# Patient Record
Sex: FEMALE | Race: OTHER
[De-identification: names, ages, dates, MRNs, and addresses within clinical notes are randomized per-mention and may not be internally consistent; named-entity substitution may affect disease eponyms.]

---

## 2021-09-17 ENCOUNTER — RESULT REVIEW (OUTPATIENT)
Age: 56
End: 2021-09-17

## 2022-05-07 ENCOUNTER — RESULT REVIEW (OUTPATIENT)
Age: 57
End: 2022-05-07

## 2022-09-30 ENCOUNTER — APPOINTMENT (OUTPATIENT)
Dept: VASCULAR SURGERY | Facility: CLINIC | Age: 57
End: 2022-09-30

## 2022-09-30 VITALS
DIASTOLIC BLOOD PRESSURE: 80 MMHG | WEIGHT: 293 LBS | SYSTOLIC BLOOD PRESSURE: 138 MMHG | HEIGHT: 69 IN | HEART RATE: 52 BPM | BODY MASS INDEX: 43.4 KG/M2 | TEMPERATURE: 95.5 F

## 2022-09-30 PROCEDURE — 99204 OFFICE O/P NEW MOD 45 MIN: CPT

## 2022-09-30 PROCEDURE — 93970 EXTREMITY STUDY: CPT

## 2023-06-08 ENCOUNTER — OUTPATIENT (OUTPATIENT)
Dept: OUTPATIENT SERVICES | Facility: HOSPITAL | Age: 58
LOS: 1 days | End: 2023-06-08
Payer: COMMERCIAL

## 2023-06-08 VITALS
HEIGHT: 69 IN | HEART RATE: 65 BPM | TEMPERATURE: 98 F | RESPIRATION RATE: 16 BRPM | DIASTOLIC BLOOD PRESSURE: 79 MMHG | WEIGHT: 293 LBS | SYSTOLIC BLOOD PRESSURE: 147 MMHG | OXYGEN SATURATION: 98 %

## 2023-06-08 DIAGNOSIS — Z98.891 HISTORY OF UTERINE SCAR FROM PREVIOUS SURGERY: Chronic | ICD-10-CM

## 2023-06-08 DIAGNOSIS — N95.0 POSTMENOPAUSAL BLEEDING: ICD-10-CM

## 2023-06-08 DIAGNOSIS — Z01.818 ENCOUNTER FOR OTHER PREPROCEDURAL EXAMINATION: ICD-10-CM

## 2023-06-08 DIAGNOSIS — Z98.890 OTHER SPECIFIED POSTPROCEDURAL STATES: Chronic | ICD-10-CM

## 2023-06-08 DIAGNOSIS — E66.01 MORBID (SEVERE) OBESITY DUE TO EXCESS CALORIES: ICD-10-CM

## 2023-06-08 DIAGNOSIS — T83.32XA DISPLACEMENT OF INTRAUTERINE CONTRACEPTIVE DEVICE, INITIAL ENCOUNTER: ICD-10-CM

## 2023-06-08 DIAGNOSIS — I10 ESSENTIAL (PRIMARY) HYPERTENSION: ICD-10-CM

## 2023-06-08 LAB
ALBUMIN SERPL ELPH-MCNC: 2.5 G/DL — LOW (ref 3.5–5)
ALP SERPL-CCNC: 100 U/L — SIGNIFICANT CHANGE UP (ref 40–120)
ALT FLD-CCNC: 13 U/L DA — SIGNIFICANT CHANGE UP (ref 10–60)
ANION GAP SERPL CALC-SCNC: 2 MMOL/L — LOW (ref 5–17)
APTT BLD: 31.3 SEC — SIGNIFICANT CHANGE UP (ref 27.5–35.5)
AST SERPL-CCNC: 9 U/L — LOW (ref 10–40)
BILIRUB SERPL-MCNC: 0.4 MG/DL — SIGNIFICANT CHANGE UP (ref 0.2–1.2)
BLD GP AB SCN SERPL QL: SIGNIFICANT CHANGE UP
BUN SERPL-MCNC: 18 MG/DL — SIGNIFICANT CHANGE UP (ref 7–18)
CALCIUM SERPL-MCNC: 8.6 MG/DL — SIGNIFICANT CHANGE UP (ref 8.4–10.5)
CHLORIDE SERPL-SCNC: 112 MMOL/L — HIGH (ref 96–108)
CO2 SERPL-SCNC: 29 MMOL/L — SIGNIFICANT CHANGE UP (ref 22–31)
CREAT SERPL-MCNC: 1.29 MG/DL — SIGNIFICANT CHANGE UP (ref 0.5–1.3)
EGFR: 48 ML/MIN/1.73M2 — LOW
GLUCOSE SERPL-MCNC: 97 MG/DL — SIGNIFICANT CHANGE UP (ref 70–99)
HCT VFR BLD CALC: 32.3 % — LOW (ref 34.5–45)
HGB BLD-MCNC: 9.5 G/DL — LOW (ref 11.5–15.5)
INR BLD: 1.12 RATIO — SIGNIFICANT CHANGE UP (ref 0.88–1.16)
MCHC RBC-ENTMCNC: 26.9 PG — LOW (ref 27–34)
MCHC RBC-ENTMCNC: 29.4 GM/DL — LOW (ref 32–36)
MCV RBC AUTO: 91.5 FL — SIGNIFICANT CHANGE UP (ref 80–100)
NRBC # BLD: 0 /100 WBCS — SIGNIFICANT CHANGE UP (ref 0–0)
PLATELET # BLD AUTO: 378 K/UL — SIGNIFICANT CHANGE UP (ref 150–400)
POTASSIUM SERPL-MCNC: 4.3 MMOL/L — SIGNIFICANT CHANGE UP (ref 3.5–5.3)
POTASSIUM SERPL-SCNC: 4.3 MMOL/L — SIGNIFICANT CHANGE UP (ref 3.5–5.3)
PROT SERPL-MCNC: 7.8 G/DL — SIGNIFICANT CHANGE UP (ref 6–8.3)
PROTHROM AB SERPL-ACNC: 13.3 SEC — SIGNIFICANT CHANGE UP (ref 10.5–13.4)
RBC # BLD: 3.53 M/UL — LOW (ref 3.8–5.2)
RBC # FLD: 15.4 % — HIGH (ref 10.3–14.5)
SODIUM SERPL-SCNC: 143 MMOL/L — SIGNIFICANT CHANGE UP (ref 135–145)
WBC # BLD: 8.35 K/UL — SIGNIFICANT CHANGE UP (ref 3.8–10.5)
WBC # FLD AUTO: 8.35 K/UL — SIGNIFICANT CHANGE UP (ref 3.8–10.5)

## 2023-06-08 PROCEDURE — 71046 X-RAY EXAM CHEST 2 VIEWS: CPT | Mod: 26

## 2023-06-08 NOTE — H&P PST ADULT - ASSESSMENT
Dilation and Curettage with Hysteroscopy and Hysteroscopic Intrauterine Device Removal on 6/12/23 with Dr. Sidhu   58 y.o morbidly obese female with Post Menopausal Bleeding and now for schedule Dilation and Curettage with Hysteroscopy and   Hysteroscopic Intrauterine Device Removal on 6/12/23 with Dr. Sidhu   Dilation and Curettage with Hysteroscopy and Hysteroscopic Intrauterine Device Removal on 6/12/23 with Dr. Nahum FINE 4- High Risk for KATIE

## 2023-06-08 NOTE — H&P PST ADULT - REASON FOR ADMISSION
Dilation and Curettage with Hysteroscopy and Hysteroscopic Intrauterine Device Removal on 6/12/23 with Dr. Sidhu

## 2023-06-08 NOTE — H&P PST ADULT - PROBLEM SELECTOR PLAN 3
Education provided:   Pt encourage to eat plenty of fruits and vegetables. Limiting red meats, processed foods (chips, cookies, sugary cereals) and sugar-sweetened beverages like soda and juice. Slowly increasing physical activity.  Pt also instructed to f/u with with PCP after surgery

## 2023-06-08 NOTE — H&P PST ADULT - HISTORY OF PRESENT ILLNESS
Dilation and Curettage with Hysteroscopy and Hysteroscopic Intrauterine Device Removal on 6/12/23 with Dr. Sidhu   58 y.o morbidly obese female with PMHx for Obesity (BMI 62 ) HTN, right wrist surgery 2013. C/O post menopausal bleeding for 3 months  and now presents for presurgical evaluation for schedule Dilation and Curettage with Hysteroscopy and   Hysteroscopic Intrauterine Device Removal on 6/12/23 with Dr. Sidhu

## 2023-06-08 NOTE — H&P PST ADULT - PROBLEM SELECTOR PLAN 1
Pt schedule for Dilation and Curettage with Hysteroscopy and Hysteroscopic Intrauterine Device Removal on 6/12/23 with Dr. Sidhu    Labs drawn in PCP - results in chart   Pt Medical clearance- in chart      Pt was  instructed to stop aspirin/ecotrin and all over the counter medication including vitamins and herbal supplements one week prior to surgery   Instructions given on the use of 4% chlorhexidine wash and Pt verbalized understanding of same   Pt Instructed to have nothing by mouth starting midnight day before surgery  Patient is to expect a phone call day before surgery between the hours of 430- 630pm giving arrival time for surgery   Written and verbal preoperative instructions given to patient with understanding verbalized.     Patient today with STOP bang score 3  Low  risk for KATIE Pt schedule for Dilation and Curettage with Hysteroscopy and Hysteroscopic Intrauterine Device Removal on 6/12/23 with Dr. Sidhu    Labs and CXR done in PST 6/8/23- will f/u result   Pt was seen by PCP for Medical clearance6/8/23- will f/u report in chart    Pt was  instructed to stop aspirin/ecotrin and all over the counter medication including vitamins and herbal supplements one week prior to surgery   Instructions given on the use of 4% chlorhexidine wash and Pt verbalized understanding of same   Pt Instructed to have nothing by mouth starting midnight day before surgery  Patient is to expect a phone call day before surgery between the hours of 430- 630pm giving arrival time for surgery   Written and verbal preoperative instructions given to patient with understanding verbalized.     Patient today with STOP bang score 4  High risk for KATIE

## 2023-06-08 NOTE — H&P PST ADULT - NSICDXPROCEDURE_GEN_ALL_CORE_FT
PROCEDURES:  Hysteroscopy with biopsy 08-Jun-2023 15:52:31  Rashmi Rojas  Hysteroscopy, with IUD removal 08-Jun-2023 15:53:31  Rashmi Rojas

## 2023-06-09 PROCEDURE — 71046 X-RAY EXAM CHEST 2 VIEWS: CPT

## 2023-06-09 PROCEDURE — G0463: CPT

## 2023-06-11 ENCOUNTER — TRANSCRIPTION ENCOUNTER (OUTPATIENT)
Age: 58
End: 2023-06-11

## 2023-06-12 ENCOUNTER — OUTPATIENT (OUTPATIENT)
Dept: OUTPATIENT SERVICES | Facility: HOSPITAL | Age: 58
LOS: 1 days | End: 2023-06-12
Payer: COMMERCIAL

## 2023-06-12 ENCOUNTER — TRANSCRIPTION ENCOUNTER (OUTPATIENT)
Age: 58
End: 2023-06-12

## 2023-06-12 VITALS
RESPIRATION RATE: 18 BRPM | OXYGEN SATURATION: 99 % | DIASTOLIC BLOOD PRESSURE: 79 MMHG | TEMPERATURE: 98 F | HEART RATE: 61 BPM | SYSTOLIC BLOOD PRESSURE: 114 MMHG

## 2023-06-12 VITALS
RESPIRATION RATE: 16 BRPM | WEIGHT: 293 LBS | SYSTOLIC BLOOD PRESSURE: 120 MMHG | HEIGHT: 69 IN | HEART RATE: 64 BPM | OXYGEN SATURATION: 97 % | DIASTOLIC BLOOD PRESSURE: 75 MMHG | TEMPERATURE: 99 F

## 2023-06-12 DIAGNOSIS — Z98.890 OTHER SPECIFIED POSTPROCEDURAL STATES: Chronic | ICD-10-CM

## 2023-06-12 DIAGNOSIS — N95.0 POSTMENOPAUSAL BLEEDING: ICD-10-CM

## 2023-06-12 DIAGNOSIS — Z01.818 ENCOUNTER FOR OTHER PREPROCEDURAL EXAMINATION: ICD-10-CM

## 2023-06-12 DIAGNOSIS — Z98.891 HISTORY OF UTERINE SCAR FROM PREVIOUS SURGERY: Chronic | ICD-10-CM

## 2023-06-12 DIAGNOSIS — T83.32XA DISPLACEMENT OF INTRAUTERINE CONTRACEPTIVE DEVICE, INITIAL ENCOUNTER: ICD-10-CM

## 2023-06-12 LAB — BLD GP AB SCN SERPL QL: SIGNIFICANT CHANGE UP

## 2023-06-12 PROCEDURE — 86901 BLOOD TYPING SEROLOGIC RH(D): CPT

## 2023-06-12 PROCEDURE — 88342 IMHCHEM/IMCYTCHM 1ST ANTB: CPT | Mod: 26,59

## 2023-06-12 PROCEDURE — 86900 BLOOD TYPING SEROLOGIC ABO: CPT

## 2023-06-12 PROCEDURE — 58562 HYSTEROSCOPY REMOVE FB: CPT

## 2023-06-12 PROCEDURE — 88341 IMHCHEM/IMCYTCHM EA ADD ANTB: CPT

## 2023-06-12 PROCEDURE — 88360 TUMOR IMMUNOHISTOCHEM/MANUAL: CPT | Mod: 26

## 2023-06-12 PROCEDURE — 88305 TISSUE EXAM BY PATHOLOGIST: CPT | Mod: 26

## 2023-06-12 PROCEDURE — 88300 SURGICAL PATH GROSS: CPT

## 2023-06-12 PROCEDURE — C1782: CPT

## 2023-06-12 PROCEDURE — 88341 IMHCHEM/IMCYTCHM EA ADD ANTB: CPT | Mod: 26,59

## 2023-06-12 PROCEDURE — 88300 SURGICAL PATH GROSS: CPT | Mod: 26,59

## 2023-06-12 PROCEDURE — 88360 TUMOR IMMUNOHISTOCHEM/MANUAL: CPT

## 2023-06-12 PROCEDURE — 88305 TISSUE EXAM BY PATHOLOGIST: CPT

## 2023-06-12 PROCEDURE — 88342 IMHCHEM/IMCYTCHM 1ST ANTB: CPT

## 2023-06-12 PROCEDURE — 36415 COLL VENOUS BLD VENIPUNCTURE: CPT

## 2023-06-12 PROCEDURE — 86850 RBC ANTIBODY SCREEN: CPT

## 2023-06-12 DEVICE — MYOSURE TISSUE REMOVAL DEVICE REACH: Type: IMPLANTABLE DEVICE | Status: FUNCTIONAL

## 2023-06-12 DEVICE — AVETA FLEX RESECTING DEVICE 2.9MM: Type: IMPLANTABLE DEVICE | Status: FUNCTIONAL

## 2023-06-12 DEVICE — MYOSURE TISSUE REMOVAL FMS FOR FLUENT XL: Type: IMPLANTABLE DEVICE | Status: FUNCTIONAL

## 2023-06-12 RX ORDER — IBUPROFEN 200 MG
1 TABLET ORAL
Qty: 0 | Refills: 0 | DISCHARGE
Start: 2023-06-12

## 2023-06-12 RX ORDER — SODIUM CHLORIDE 9 MG/ML
3 INJECTION INTRAMUSCULAR; INTRAVENOUS; SUBCUTANEOUS EVERY 8 HOURS
Refills: 0 | Status: DISCONTINUED | OUTPATIENT
Start: 2023-06-12 | End: 2023-06-12

## 2023-06-12 RX ORDER — IBUPROFEN 200 MG
600 TABLET ORAL EVERY 6 HOURS
Refills: 0 | Status: DISCONTINUED | OUTPATIENT
Start: 2023-06-12 | End: 2023-06-26

## 2023-06-12 NOTE — BRIEF OPERATIVE NOTE - OPERATION/FINDINGS
Copper T IUD impacted in the lower uterine segment, removed in two pieces.  Upper uterine cavity obliterated by adhesion.

## 2023-06-12 NOTE — BRIEF OPERATIVE NOTE - NSICDXBRIEFPREOP_GEN_ALL_CORE_FT
PRE-OP DIAGNOSIS:  Post-menopausal bleeding 12-Jun-2023 09:27:09  Derek Sidhu  Retained intrauterine contraceptive device (IUD) 12-Jun-2023 09:27:39  Derek Sidhu

## 2023-06-12 NOTE — ASU DISCHARGE PLAN (ADULT/PEDIATRIC) - NS MD DC FALL RISK RISK
For information on Fall & Injury Prevention, visit: [email or web address] OR  [email or web address] OR  https://www.cdc.gov/steadi/patient.html

## 2023-06-12 NOTE — BRIEF OPERATIVE NOTE - NSICDXBRIEFPOSTOP_GEN_ALL_CORE_FT
POST-OP DIAGNOSIS:  Postmenopausal bleeding 12-Jun-2023 09:28:23  Derek Sidhu  Retained intrauterine contraceptive device (IUD) 12-Jun-2023 09:28:30  Derek Sidhu

## 2023-06-12 NOTE — ASU DISCHARGE PLAN (ADULT/PEDIATRIC) - CARE PROVIDER_API CALL
Derek Sidhu  Obstetrics and Gynecology  [address] [place] [address]  Phone: [phone]  Fax: [phone]  Follow Up Time: 2 weeks

## 2023-06-12 NOTE — BRIEF OPERATIVE NOTE - NSICDXBRIEFPROCEDURE_GEN_ALL_CORE_FT
PROCEDURES:  Hysteroscopy, with dilation and curettage 12-Jun-2023 09:25:58  Derek Sidhu  Removal, foreign body, hysteroscopic 12-Jun-2023 09:26:30  Derek Sidhu

## 2023-06-14 LAB — SURGICAL PATHOLOGY STUDY: SIGNIFICANT CHANGE UP

## 2023-10-20 ENCOUNTER — APPOINTMENT (OUTPATIENT)
Dept: VASCULAR SURGERY | Facility: CLINIC | Age: 58
End: 2023-10-20
Payer: COMMERCIAL

## 2023-10-20 VITALS
BODY MASS INDEX: 43.4 KG/M2 | HEART RATE: 67 BPM | TEMPERATURE: 98 F | HEIGHT: 69 IN | DIASTOLIC BLOOD PRESSURE: 82 MMHG | SYSTOLIC BLOOD PRESSURE: 126 MMHG | WEIGHT: 293 LBS

## 2023-10-20 VITALS — DIASTOLIC BLOOD PRESSURE: 88 MMHG | HEART RATE: 64 BPM | SYSTOLIC BLOOD PRESSURE: 130 MMHG

## 2023-10-20 PROCEDURE — 99212 OFFICE O/P EST SF 10 MIN: CPT

## 2023-10-20 PROCEDURE — 93970 EXTREMITY STUDY: CPT

## 2023-11-28 PROBLEM — I10 ESSENTIAL (PRIMARY) HYPERTENSION: Chronic | Status: ACTIVE | Noted: 2023-06-08

## 2023-11-28 PROBLEM — N93.9 ABNORMAL UTERINE AND VAGINAL BLEEDING, UNSPECIFIED: Chronic | Status: ACTIVE | Noted: 2023-06-08

## 2023-11-28 PROBLEM — E66.9 OBESITY, UNSPECIFIED: Chronic | Status: ACTIVE | Noted: 2023-06-08

## 2023-12-12 ENCOUNTER — APPOINTMENT (OUTPATIENT)
Dept: VASCULAR SURGERY | Facility: CLINIC | Age: 58
End: 2023-12-12
Payer: COMMERCIAL

## 2023-12-12 PROCEDURE — 99442: CPT

## 2023-12-12 RX ORDER — HYDROCHLOROTHIAZIDE 12.5 MG/1
TABLET ORAL
Refills: 0 | Status: ACTIVE | COMMUNITY

## 2023-12-12 RX ORDER — LOSARTAN POTASSIUM 100 MG/1
TABLET, FILM COATED ORAL
Refills: 0 | Status: ACTIVE | COMMUNITY

## 2023-12-12 RX ORDER — CARVEDILOL 3.12 MG/1
TABLET, FILM COATED ORAL
Refills: 0 | Status: ACTIVE | COMMUNITY

## 2024-02-22 ENCOUNTER — TRANSCRIPTION ENCOUNTER (OUTPATIENT)
Age: 59
End: 2024-02-22

## 2024-02-22 VITALS
HEIGHT: 69 IN | DIASTOLIC BLOOD PRESSURE: 75 MMHG | HEART RATE: 60 BPM | SYSTOLIC BLOOD PRESSURE: 137 MMHG | WEIGHT: 293 LBS | TEMPERATURE: 97 F | RESPIRATION RATE: 18 BRPM | OXYGEN SATURATION: 96 %

## 2024-02-22 NOTE — ASU PATIENT PROFILE, ADULT - FALL HARM RISK - UNIVERSAL INTERVENTIONS
Bed in lowest position, wheels locked, appropriate side rails in place/Call bell, personal items and telephone in reach/Instruct patient to call for assistance before getting out of bed or chair/Non-slip footwear when patient is out of bed/[place] to call system/Physically safe environment - no spills, clutter or unnecessary equipment/Purposeful Proactive Rounding/Room/bathroom lighting operational, light cord in reach

## 2024-02-22 NOTE — ASU PATIENT PROFILE, ADULT - NSICDXPASTSURGICALHX_GEN_ALL_CORE_FT
PAST SURGICAL HISTORY:  H/O  section     H/O wrist surgery right + hardware    History of ectopic pregnancy

## 2024-02-23 ENCOUNTER — TRANSCRIPTION ENCOUNTER (OUTPATIENT)
Age: 59
End: 2024-02-23

## 2024-02-23 ENCOUNTER — OUTPATIENT (OUTPATIENT)
Dept: OUTPATIENT SERVICES | Facility: HOSPITAL | Age: 59
LOS: 1 days | Discharge: HOME | End: 2024-02-23
Payer: COMMERCIAL

## 2024-02-23 VITALS
SYSTOLIC BLOOD PRESSURE: 119 MMHG | HEART RATE: 55 BPM | TEMPERATURE: 96 F | OXYGEN SATURATION: 94 % | RESPIRATION RATE: 15 BRPM | DIASTOLIC BLOOD PRESSURE: 59 MMHG

## 2024-02-23 DIAGNOSIS — Z98.890 OTHER SPECIFIED POSTPROCEDURAL STATES: Chronic | ICD-10-CM

## 2024-02-23 DIAGNOSIS — Z98.891 HISTORY OF UTERINE SCAR FROM PREVIOUS SURGERY: Chronic | ICD-10-CM

## 2024-02-23 DIAGNOSIS — Z87.59 PERSONAL HISTORY OF OTHER COMPLICATIONS OF PREGNANCY, CHILDBIRTH AND THE PUERPERIUM: Chronic | ICD-10-CM

## 2024-02-23 LAB
BLD GP AB SCN SERPL QL: NEGATIVE — SIGNIFICANT CHANGE UP
RH IG SCN BLD-IMP: POSITIVE — SIGNIFICANT CHANGE UP

## 2024-02-23 PROCEDURE — 88305 TISSUE EXAM BY PATHOLOGIST: CPT

## 2024-02-23 PROCEDURE — 86900 BLOOD TYPING SEROLOGIC ABO: CPT

## 2024-02-23 PROCEDURE — 86850 RBC ANTIBODY SCREEN: CPT

## 2024-02-23 PROCEDURE — 88341 IMHCHEM/IMCYTCHM EA ADD ANTB: CPT

## 2024-02-23 PROCEDURE — 88305 TISSUE EXAM BY PATHOLOGIST: CPT | Mod: 26

## 2024-02-23 PROCEDURE — 88342 IMHCHEM/IMCYTCHM 1ST ANTB: CPT | Mod: 26

## 2024-02-23 PROCEDURE — 86901 BLOOD TYPING SEROLOGIC RH(D): CPT

## 2024-02-23 PROCEDURE — 58120 DILATION AND CURETTAGE: CPT

## 2024-02-23 PROCEDURE — C9399: CPT

## 2024-02-23 DEVICE — MYOSURE TISSUE REMOVAL DEVICE REACH: Type: IMPLANTABLE DEVICE | Status: FUNCTIONAL

## 2024-02-23 RX ORDER — HYDROMORPHONE HYDROCHLORIDE 2 MG/ML
0.5 INJECTION INTRAMUSCULAR; INTRAVENOUS; SUBCUTANEOUS
Refills: 0 | Status: DISCONTINUED | OUTPATIENT
Start: 2024-02-23 | End: 2024-02-23

## 2024-02-23 RX ORDER — PANTOPRAZOLE SODIUM 20 MG/1
20 TABLET, DELAYED RELEASE ORAL DAILY
Refills: 0 | Status: DISCONTINUED | OUTPATIENT
Start: 2024-02-23 | End: 2024-02-23

## 2024-02-23 RX ORDER — SIMETHICONE 80 MG/1
80 TABLET, CHEWABLE ORAL EVERY 6 HOURS
Refills: 0 | Status: DISCONTINUED | OUTPATIENT
Start: 2024-02-23 | End: 2024-02-23

## 2024-02-23 RX ORDER — KETOROLAC TROMETHAMINE 30 MG/ML
30 SYRINGE (ML) INJECTION EVERY 6 HOURS
Refills: 0 | Status: DISCONTINUED | OUTPATIENT
Start: 2024-02-23 | End: 2024-02-23

## 2024-02-23 RX ORDER — OXYCODONE HYDROCHLORIDE 5 MG/1
10 TABLET ORAL EVERY 4 HOURS
Refills: 0 | Status: DISCONTINUED | OUTPATIENT
Start: 2024-02-23 | End: 2024-02-23

## 2024-02-23 RX ORDER — ACETAMINOPHEN 500 MG
1000 TABLET ORAL EVERY 6 HOURS
Refills: 0 | Status: DISCONTINUED | OUTPATIENT
Start: 2024-02-23 | End: 2024-02-23

## 2024-02-23 RX ORDER — OXYCODONE HYDROCHLORIDE 5 MG/1
5 TABLET ORAL EVERY 4 HOURS
Refills: 0 | Status: DISCONTINUED | OUTPATIENT
Start: 2024-02-23 | End: 2024-02-23

## 2024-02-23 RX ORDER — METOCLOPRAMIDE HCL 10 MG
10 TABLET ORAL EVERY 6 HOURS
Refills: 0 | Status: DISCONTINUED | OUTPATIENT
Start: 2024-02-23 | End: 2024-02-23

## 2024-02-23 RX ORDER — ONDANSETRON 8 MG/1
8 TABLET, FILM COATED ORAL EVERY 6 HOURS
Refills: 0 | Status: DISCONTINUED | OUTPATIENT
Start: 2024-02-23 | End: 2024-02-23

## 2024-02-23 RX ORDER — SODIUM CHLORIDE 9 MG/ML
1000 INJECTION, SOLUTION INTRAVENOUS
Refills: 0 | Status: DISCONTINUED | OUTPATIENT
Start: 2024-02-23 | End: 2024-02-23

## 2024-02-23 RX ADMIN — SODIUM CHLORIDE 125 MILLILITER(S): 9 INJECTION, SOLUTION INTRAVENOUS at 13:56

## 2024-02-23 RX ADMIN — PANTOPRAZOLE SODIUM 20 MILLIGRAM(S): 20 TABLET, DELAYED RELEASE ORAL at 13:59

## 2024-02-23 NOTE — BRIEF OPERATIVE NOTE - NSICDXBRIEFPROCEDURE_GEN_ALL_CORE_FT
PROCEDURES:  Exam under anesthesia, pelvis 23-Feb-2024 13:09:26  Socorro Sutherland  D&C (dilatation and curettage, scraping of uterus) 23-Feb-2024 13:10:11  Socorro Sutherland

## 2024-02-23 NOTE — PRE-ANESTHESIA EVALUATION ADULT - NSDENTALSD_ENT_ALL_CORE
Missing rear molars. Poor dentition with several chipped teeth, ground/worn teeth, cavities./missing teeth

## 2024-02-23 NOTE — PRE-ANESTHESIA EVALUATION ADULT - NSANTHPMHFT_GEN_ALL_CORE
General: Positive for BMI 61.  Cardiac: Positive for HTN, HLD. Denies MI/Angina/Heart Failure, Arrhythmia, Murmur/Valvular Disorder. >4 METS  Pulmonary: Denies Asthma, COPD, KATIE  Renal: Positive for creatinine elevation, 1.2  Hepatic: Denies liver dysfunction  Gastrointestinal: Denies GERD/IBS  Endocrine: Positive for prediabetes. Denies thyroid dysfunction.  Neurologic: Denies stroke/seizure disorder  Hematologic: Positive for anemia. Denies blood clotting disorder, blood thinning medication.    PSH: Ectopic pregnancy, wrist surgery,  section.

## 2024-02-23 NOTE — PRE-ANESTHESIA EVALUATION ADULT - NSRADCARDRESULTSFT_GEN_ALL_CORE
EKG NSR    TTE 2/15/2024  "1. LV cavity size is normal. Normal left ventricular systolic function. LV Ejection Fraction is 58%.  2. The left atrium is normal in size.  3. The aortic valve has a trileaflet configuration. The aortic valve cusps appear moderately thickened/sclerotic. Aortic valve cusps appear moderately calcified. Moderate aortic valve stenosis.  4. There is mild tricuspid regurgitation.  5. There is mild pulmonic regurgitation.  6. The aortic root is normal in size.  7. The inferior vena cava is of normal size. The inferior vena cava shows a normal respiratory collapse.  8. Technically difficult study."

## 2024-02-23 NOTE — ASU DISCHARGE PLAN (ADULT/PEDIATRIC) - NS MD DC FALL RISK RISK
For information on Fall & Injury Prevention, visit: [email or web address] OR  [email or web address] OR  https://www.cdc.gov/steadi/patient.html

## 2024-02-23 NOTE — ASU DISCHARGE PLAN (ADULT/PEDIATRIC) - CARE PROVIDER_API CALL
Laury Solis)  Obstetrics and Gynecology  [address], [place] [address]  Phone: [phone]  Fax: [phone]  Established Patient  Follow Up Time:

## 2024-02-23 NOTE — BRIEF OPERATIVE NOTE - OPERATION/FINDINGS
Exam under anesthesia. Uterus mobile, anteverted. Cervix dilated to size #10. Dilation and curettage of uterine cavity performed. Endometrial curettage with sampling sent to pathology.

## 2024-02-23 NOTE — PRE-ANESTHESIA EVALUATION ADULT - SPO2 (%)
[Follow-Up Visit] : a follow-up visit for [Knee Pain] : knee pain [FreeTextEntry2] : low back pain 96 No

## 2024-02-23 NOTE — ASU DISCHARGE PLAN (ADULT/PEDIATRIC) - ASU DC SPECIAL INSTRUCTIONSFT
- Nothing in vagina - no intercourse, tampons, or douching until cleared by your doctor.   - Avoid swimming, tub baths, and heavy lifting until cleared by your doctor.   - Showering is ok.   - Continue oral pain medications as needed for pain. Can take tylenol 1000mg every 6 hours as needed in addition to ibuprofen 600 mg every 6 hours as needed.   - Follow up in office in 1-2 weeks for your postoperative visit.    - Call the office sooner if you develop any fever, heavy bleeding, or severe pain.  Go to the closest emergency room for any of these symptoms if you are not able to contact your doctor.

## 2024-02-23 NOTE — ASU DISCHARGE PLAN (ADULT/PEDIATRIC) - MEDICATION INSTRUCTIONS
Can take tylenol 1000mg every 6 hours as needed in addition to ibuprofen 600 mg every 6 hours as needed.

## 2024-02-28 LAB — SURGICAL PATHOLOGY STUDY: SIGNIFICANT CHANGE UP

## 2024-03-08 PROBLEM — R01.1 CARDIAC MURMUR, UNSPECIFIED: Chronic | Status: ACTIVE | Noted: 2024-02-23

## 2024-03-29 ENCOUNTER — APPOINTMENT (OUTPATIENT)
Dept: GYNECOLOGIC ONCOLOGY | Facility: CLINIC | Age: 59
End: 2024-03-29
Payer: COMMERCIAL

## 2024-03-29 ENCOUNTER — NON-APPOINTMENT (OUTPATIENT)
Age: 59
End: 2024-03-29

## 2024-03-29 VITALS
HEART RATE: 86 BPM | SYSTOLIC BLOOD PRESSURE: 162 MMHG | HEIGHT: 69 IN | DIASTOLIC BLOOD PRESSURE: 103 MMHG | BODY MASS INDEX: 43.4 KG/M2 | TEMPERATURE: 96.7 F | OXYGEN SATURATION: 91 % | WEIGHT: 293 LBS

## 2024-03-29 DIAGNOSIS — Z13.1 ENCOUNTER FOR SCREENING FOR DIABETES MELLITUS: ICD-10-CM

## 2024-03-29 PROCEDURE — 99205 OFFICE O/P NEW HI 60 MIN: CPT

## 2024-03-29 RX ORDER — MISOPROSTOL 200 UG/1
200 TABLET ORAL
Qty: 8 | Refills: 0 | Status: ACTIVE | COMMUNITY
Start: 2024-03-29 | End: 1900-01-01

## 2024-03-29 NOTE — PHYSICAL EXAM
[Normal] : Bimanual Exam: Normal [Chaperone Present] : A chaperone was present in the examining room during all aspects of the physical examination [Fully active, able to carry on all pre-disease performance without restriction] : Status 0 - Fully active, able to carry on all pre-disease performance without restriction

## 2024-03-29 NOTE — HISTORY OF PRESENT ILLNESS
[FreeTextEntry1] : Problem 1) Thickened endometrium 2) PMB   Previous Therapy 1) pelvic US 24   a) Uterus 10.4cm, 8mm endometrial stripe 1) D&C, hysteroscopy  24    a) benign squamous and endocervial epithelium  57yo postmenopausal since 45yo referred by Dr. Laury Solis for thickened endometrium and post menopausal bleeding. EMB done last month without any endometrial tissue. Patient reports PMB (started a few years ago) which they thought was related to her copper IUD. They tried to remove IUD but the strings broke, so they offered her a hysterectomy at that time but she declined. IUD ultimately removed 2023 via hysteroscopy) However the bleeding returned in 2024. She noted passing clots occurring every few days which stopped since end of February. Dr. Solis performed EMB but pathology showed no evidence of endometrial tissue.  OBhx; 1x , 1x CS, 1 x SAB, 1 x VTOP D+C, 1x ectopic s/p salpingectomy Gynhx: PMB PMH: HTN, heart murmur (seeing cardiology) PSH: CS, open salpingectomy, right wrist surgery, EMB under anesthesia 2024 meds: valsartan/HCTZ 100/0.25 qd, Carvedilol 6.25 BID, Atorvastatin 10 qd, probiotics all: lisinopril (lip swelling) social: denies Fhx: brother w/ lung cancer dx at 47yo, maternal grandmother w/ breast cancer dx in her 60's)  Last pap smear: 2023 (wnl) Last mammogram:  (wnl) colonoscopy:  wnl

## 2024-03-29 NOTE — DISCUSSION/SUMMARY
[Reviewed Clinical Lab Test(s)] : Results of clinical tests were reviewed. [Reviewed Radiology Report(s)] : Radiology reports were reviewed. [Visit Time ___ Minutes] : [unfilled] minutes [Face to Face Time___ Minutes] : with [unfilled] minutes in face to face consultation. [FreeTextEntry1] : 57yo postmenopausal since 47yo referred by Dr. Laury Solis  for thickened endometrium and post menopausal bleeding. EMB done last month without any endometrial tissue.   I discussed with the patient with the aid of diagrams, reviewed the findings on history and physical examination, and reviewed the imaging studies in detail. She has a thickened endometrial lining and PMB.   Benign, pre-malignant (such as atypical hyperplasia) and malignant causes of these findings discussed. In order to determine the cause of her symptoms, sampling of the uterus is necessary. The uterus can be sampled either by an in-office endometrial biopsy or via D&C hysteroscopy. Given EMB did not yield endometrial tissue, hysteroscopy D+C is warranted as next step.   Complications that include, but are not limited to: bleeding, infection and uterine perforation discussed. In the case of uterine perforation, diagnostic laparoscopy may be indicated. Cervical stenosis and possible inability to enter the uterine cavity was also discussed. I have also provided her with the diagrams.   Surgical scheduling was discussed and instructions for optimization prior to surgery were given. NPO after midnight.  No aspirin or NSAID products for 1 week prior. Instructions for misoprostol 48 hours prior to surgery given.  A copy of the above diagrams was given to the patient.   [] hysteroscopy, D+C [] pre-op clearance by PCP [] pre-op labs drawn today [] pre-op misoprostol sent

## 2024-04-02 ENCOUNTER — NON-APPOINTMENT (OUTPATIENT)
Age: 59
End: 2024-04-02

## 2024-04-02 LAB
ALBUMIN SERPL ELPH-MCNC: 4 G/DL
ALP BLD-CCNC: 121 U/L
ALT SERPL-CCNC: 14 U/L
ANION GAP SERPL CALC-SCNC: 14 MMOL/L
APTT BLD: 29.5 SEC
AST SERPL-CCNC: 21 U/L
BASOPHILS # BLD AUTO: 0.07 K/UL
BASOPHILS NFR BLD AUTO: 0.9 %
BILIRUB SERPL-MCNC: 0.3 MG/DL
BUN SERPL-MCNC: 21 MG/DL
CALCIUM SERPL-MCNC: 9.2 MG/DL
CHLORIDE SERPL-SCNC: 106 MMOL/L
CO2 SERPL-SCNC: 21 MMOL/L
CREAT SERPL-MCNC: 1.22 MG/DL
EGFR: 51 ML/MIN/1.73M2
EOSINOPHIL # BLD AUTO: 0.26 K/UL
EOSINOPHIL NFR BLD AUTO: 3.4 %
ESTIMATED AVERAGE GLUCOSE: 131 MG/DL
GLUCOSE SERPL-MCNC: 106 MG/DL
HBA1C MFR BLD HPLC: 6.2 %
HCT VFR BLD CALC: 38.9 %
HGB BLD-MCNC: 11.9 G/DL
IMM GRANULOCYTES NFR BLD AUTO: 0.3 %
INR PPP: 0.94 RATIO
LYMPHOCYTES # BLD AUTO: 2.16 K/UL
LYMPHOCYTES NFR BLD AUTO: 27.8 %
MAN DIFF?: NORMAL
MCHC RBC-ENTMCNC: 27.1 PG
MCHC RBC-ENTMCNC: 30.6 GM/DL
MCV RBC AUTO: 88.6 FL
MONOCYTES # BLD AUTO: 0.64 K/UL
MONOCYTES NFR BLD AUTO: 8.2 %
NEUTROPHILS # BLD AUTO: 4.61 K/UL
NEUTROPHILS NFR BLD AUTO: 59.4 %
PLATELET # BLD AUTO: 214 K/UL
POTASSIUM SERPL-SCNC: 4.4 MMOL/L
PROT SERPL-MCNC: 7.3 G/DL
PT BLD: 10.7 SEC
RBC # BLD: 4.39 M/UL
RBC # FLD: 14.9 %
SODIUM SERPL-SCNC: 140 MMOL/L
WBC # FLD AUTO: 7.76 K/UL

## 2024-04-09 ENCOUNTER — INPATIENT (INPATIENT)
Facility: HOSPITAL | Age: 59
LOS: 2 days | Discharge: HOME | DRG: 699 | End: 2024-04-12
Attending: STUDENT IN AN ORGANIZED HEALTH CARE EDUCATION/TRAINING PROGRAM | Admitting: STUDENT IN AN ORGANIZED HEALTH CARE EDUCATION/TRAINING PROGRAM
Payer: COMMERCIAL

## 2024-04-09 ENCOUNTER — RESULT REVIEW (OUTPATIENT)
Age: 59
End: 2024-04-09

## 2024-04-09 VITALS
TEMPERATURE: 100 F | WEIGHT: 293 LBS | SYSTOLIC BLOOD PRESSURE: 166 MMHG | DIASTOLIC BLOOD PRESSURE: 96 MMHG | HEART RATE: 102 BPM | RESPIRATION RATE: 20 BRPM | OXYGEN SATURATION: 96 % | HEIGHT: 69 IN

## 2024-04-09 DIAGNOSIS — Z87.59 PERSONAL HISTORY OF OTHER COMPLICATIONS OF PREGNANCY, CHILDBIRTH AND THE PUERPERIUM: Chronic | ICD-10-CM

## 2024-04-09 DIAGNOSIS — Z98.890 OTHER SPECIFIED POSTPROCEDURAL STATES: Chronic | ICD-10-CM

## 2024-04-09 DIAGNOSIS — Z98.891 HISTORY OF UTERINE SCAR FROM PREVIOUS SURGERY: Chronic | ICD-10-CM

## 2024-04-09 LAB
ANION GAP SERPL CALC-SCNC: 13 MMOL/L — SIGNIFICANT CHANGE UP (ref 5–17)
BASE EXCESS BLDV CALC-SCNC: 1 MMOL/L — SIGNIFICANT CHANGE UP (ref -2–3)
BUN SERPL-MCNC: 24 MG/DL — HIGH (ref 7–23)
CA-I SERPL-SCNC: 1.13 MMOL/L — LOW (ref 1.15–1.33)
CALCIUM SERPL-MCNC: 9.3 MG/DL — SIGNIFICANT CHANGE UP (ref 8.4–10.5)
CHLORIDE SERPL-SCNC: 102 MMOL/L — SIGNIFICANT CHANGE UP (ref 96–108)
CO2 BLDV-SCNC: 26.4 MMOL/L — HIGH (ref 22–26)
CO2 SERPL-SCNC: 24 MMOL/L — SIGNIFICANT CHANGE UP (ref 22–31)
CREAT ?TM UR-MCNC: 146 MG/DL — SIGNIFICANT CHANGE UP
CREAT SERPL-MCNC: 1.41 MG/DL — HIGH (ref 0.5–1.3)
EGFR: 43 ML/MIN/1.73M2 — LOW
GAS PNL BLDV: 137 MMOL/L — SIGNIFICANT CHANGE UP (ref 136–145)
GAS PNL BLDV: SIGNIFICANT CHANGE UP
GAS PNL BLDV: SIGNIFICANT CHANGE UP
GLUCOSE SERPL-MCNC: 120 MG/DL — HIGH (ref 70–99)
HCO3 BLDV-SCNC: 25 MMOL/L — SIGNIFICANT CHANGE UP (ref 22–29)
HCT VFR BLD CALC: 39.3 % — SIGNIFICANT CHANGE UP (ref 34.5–45)
HGB BLD-MCNC: 12.1 G/DL — SIGNIFICANT CHANGE UP (ref 11.5–15.5)
LACTATE SERPL-SCNC: 0.9 MMOL/L — SIGNIFICANT CHANGE UP (ref 0.5–2)
MCHC RBC-ENTMCNC: 27.3 PG — SIGNIFICANT CHANGE UP (ref 27–34)
MCHC RBC-ENTMCNC: 30.8 GM/DL — LOW (ref 32–36)
MCV RBC AUTO: 88.5 FL — SIGNIFICANT CHANGE UP (ref 80–100)
NRBC # BLD: 0 /100 WBCS — SIGNIFICANT CHANGE UP (ref 0–0)
OSMOLALITY UR: 629 MOSM/KG — SIGNIFICANT CHANGE UP (ref 300–900)
PCO2 BLDV: 38 MMHG — LOW (ref 39–42)
PH BLDV: 7.43 — SIGNIFICANT CHANGE UP (ref 7.32–7.43)
PLATELET # BLD AUTO: 279 K/UL — SIGNIFICANT CHANGE UP (ref 150–400)
PO2 BLDV: 47 MMHG — HIGH (ref 25–45)
POTASSIUM BLDV-SCNC: 4.1 MMOL/L — SIGNIFICANT CHANGE UP (ref 3.5–5.1)
POTASSIUM SERPL-MCNC: SIGNIFICANT CHANGE UP (ref 3.5–5.3)
POTASSIUM SERPL-SCNC: SIGNIFICANT CHANGE UP (ref 3.5–5.3)
POTASSIUM UR-SCNC: 63 MMOL/L — SIGNIFICANT CHANGE UP
PROT ?TM UR-MCNC: 121 MG/DL — HIGH (ref 0–12)
PROT/CREAT UR-RTO: 0.8 RATIO — HIGH (ref 0–0.2)
RBC # BLD: 4.44 M/UL — SIGNIFICANT CHANGE UP (ref 3.8–5.2)
RBC # FLD: 14.3 % — SIGNIFICANT CHANGE UP (ref 10.3–14.5)
SAO2 % BLDV: 84.9 % — SIGNIFICANT CHANGE UP (ref 67–88)
SODIUM SERPL-SCNC: 139 MMOL/L — SIGNIFICANT CHANGE UP (ref 135–145)
SODIUM UR-SCNC: 81 MMOL/L — SIGNIFICANT CHANGE UP
UUN UR-MCNC: 928 MG/DL — SIGNIFICANT CHANGE UP
WBC # BLD: 15.56 K/UL — HIGH (ref 3.8–10.5)
WBC # FLD AUTO: 15.56 K/UL — HIGH (ref 3.8–10.5)

## 2024-04-09 PROCEDURE — 99223 1ST HOSP IP/OBS HIGH 75: CPT

## 2024-04-09 PROCEDURE — 88112 CYTOPATH CELL ENHANCE TECH: CPT | Mod: 26

## 2024-04-09 PROCEDURE — 74178 CT ABD&PLV WO CNTR FLWD CNTR: CPT | Mod: 26,59

## 2024-04-09 PROCEDURE — 74176 CT ABD & PELVIS W/O CONTRAST: CPT | Mod: 26,MC

## 2024-04-09 PROCEDURE — 99285 EMERGENCY DEPT VISIT HI MDM: CPT

## 2024-04-09 RX ORDER — SODIUM CHLORIDE 9 MG/ML
1000 INJECTION INTRAMUSCULAR; INTRAVENOUS; SUBCUTANEOUS ONCE
Refills: 0 | Status: COMPLETED | OUTPATIENT
Start: 2024-04-09 | End: 2024-04-09

## 2024-04-09 RX ORDER — ACETAMINOPHEN 500 MG
1000 TABLET ORAL ONCE
Refills: 0 | Status: COMPLETED | OUTPATIENT
Start: 2024-04-09 | End: 2024-04-09

## 2024-04-09 RX ORDER — CEFTRIAXONE 500 MG/1
1000 INJECTION, POWDER, FOR SOLUTION INTRAMUSCULAR; INTRAVENOUS ONCE
Refills: 0 | Status: COMPLETED | OUTPATIENT
Start: 2024-04-09 | End: 2024-04-09

## 2024-04-09 RX ADMIN — Medication 1000 MILLIGRAM(S): at 19:06

## 2024-04-09 RX ADMIN — CEFTRIAXONE 100 MILLIGRAM(S): 500 INJECTION, POWDER, FOR SOLUTION INTRAMUSCULAR; INTRAVENOUS at 19:06

## 2024-04-09 RX ADMIN — SODIUM CHLORIDE 1000 MILLILITER(S): 9 INJECTION INTRAMUSCULAR; INTRAVENOUS; SUBCUTANEOUS at 20:22

## 2024-04-09 NOTE — H&P ADULT - ASSESSMENT
Patient is a 58F w/ hematuria, ayon catheter in place.  Patient manually irrigated removing approx 50cc of clots, now draining clear.      Plan:  -Admit to Urology  -Diet: Regular  -Pain control  -OOB/IS  -Up size ayon catheter to 20fr catheter.  -IV Fluids  -Continue ayon catheter.

## 2024-04-09 NOTE — ED ADULT NURSE NOTE - OBJECTIVE STATEMENT
59 yo F PMHx HTN, HLD presents to the ED co vaginal bleeding since last night. pt awake and alert, AOx4. pt reports last night she began vaginally bleeding and passing blood clots. Pt reports at baseline she is incontinent, but has not been urinating since last night,. Pt reports she is unsure if she is urinating while she is passing the blood clots. Pt reports she had a uterine biopsy 4/16 and one in February that was inconclusive. Pt denies CP, SOB, N/V/D, f/c, lightheadedness, dizziness, weakness, numbness, tingling, vision changes, burning with urination. 57 yo F PMHx HTN, HLD presents to the ED co vaginal bleeding since last night. pt awake and alert, AOx4. pt reports last night she began vaginally bleeding and passing blood clots. Pt reports at baseline she is incontinent, but has not been urinating since last night,. Pt reports she is unsure if she is urinating while she is passing the blood clots. Pt reports she had a uterine biopsy 4/16 and one in February that was inconclusive. Pt report pain and pressure in her suprapubic region. Pt denies CP, SOB, N/V/D, f/c, lightheadedness, dizziness, weakness, numbness, tingling, vision changes, burning with urination.

## 2024-04-09 NOTE — ED ADULT NURSE NOTE - CHIEF COMPLAINT
The patient is a 58y Female complaining of  Elliptical Excision Additional Text (Leave Blank If You Do Not Want): The margin was drawn around the clinically apparent lesion.  An elliptical shape was then drawn on the skin incorporating the lesion and margins.  Incisions were then made along these lines to the appropriate tissue plane and the lesion was extirpated.

## 2024-04-09 NOTE — ED PROVIDER NOTE - OBJECTIVE STATEMENT
58F PMHx HTN, HLD, currently being worked up for post-menopausal bleeding since 1/24 (Dr. Solis/Shyla). Pt underwent endometrial biopsy & D&C 2/24 without any endometrial tissue. Pt had planned hysteroscopy/D&C later this month. Pt endorses around 230 AM pt woke up w urge to urinate but felt like she couldn't empty bladder & noticed multiple clots in toilet. Pt continued to pass clots multiple times today, unsure if urinating as well. Pt denies HA, lightheadedness, CP, SOB, painful urination. 58F PMHx HTN, HLD, currently being worked up for post-menopausal bleeding since 1/24 (Dr. Solis/Yudy). Pt underwent endometrial biopsy & D&C 2/24 without any endometrial tissue. Pt had planned hysteroscopy/D&C later this month. Pt endorses around 230 AM pt woke up w urge to urinate but felt like she couldn't empty bladder & noticed multiple clots in toilet. Pt continued to pass clots multiple times today, unsure if urinating as well. Pt denies HA, lightheadedness, CP, SOB, painful urination. 58F PMHx HTN, HLD, currently being worked up for post-menopausal bleeding since 1/24 (Dr. Solis/Yudy). Pelvic US at the time showed thickened endometrium. Pt underwent endometrial biopsy & D&C 2/24 without any endometrial tissue. Pt had planned hysteroscopy/D&C later this month. Pt endorses around 230 AM pt woke up w urge to urinate but felt like she couldn't empty bladder & noticed multiple clots in toilet. Pt continued to pass clots multiple times today, unsure if urinating as well. Pt denies HA, lightheadedness, CP, SOB, painful urination.

## 2024-04-09 NOTE — H&P ADULT - NS ATTEND AMEND GEN_ALL_CORE FT
58F w/ history of post-menopausal bleeding with onset of hematuria and difficulty urinating secondary to clots. CT showed Ill-defined mass in the lower pole the right kidney. Evaluation is limited by lack of intravenous contrast. Differential includes renal neoplasm versus abscess. High density in the collecting system and bladder likely reflecting hemorrhagic products. Renal mass protocol CT or contrast-enhanced MRI is recommended for better evaluation as well as correlation with urine cytology and urinalysis. Ayon irrigated until cleared, plan for CTU for better characterization. Elisabeth dmit, IV antibiotics, ayon, work-up renal mass.

## 2024-04-09 NOTE — ED PROVIDER NOTE - PHYSICAL EXAMINATION
T(C): 37.7 (04-09-24 @ 16:06), Max: 37.7 (04-09-24 @ 16:06)  HR: 102 (04-09-24 @ 16:06) (102 - 102)  BP: 166/96 (04-09-24 @ 16:06) (166/96 - 166/96)  RR: 20 (04-09-24 @ 16:06) (20 - 20)  SpO2: 96% (04-09-24 @ 16:06) (96% - 96%)    CONSTITUTIONAL: Well groomed, no apparent distress  EYES: PERRLA and symmetric, EOMI, No conjunctival or scleral injection, non-icteric  ENMT: Oral mucosa with moist membranes.             NECK: Supple, symmetric and without tracheal deviation   RESP: No respiratory distress, no use of accessory muscles  CV: RRR, +S1S2, no MRG; no JVD; no peripheral edema  GI: Soft, ND, mildly TTP lower abdomen  LYMPH: No cervical LAD or tenderness; no axillary LAD or tenderness; no inguinal LAD or tenderness  MSK: Normal ROM without pain, no spinal tenderness, normal muscle strength/tone  SKIN: No rashes or ulcers noted; no subcutaneous nodules or induration palpable  NEURO: CN II-XII intact; no focal or motor deficits  PSYCH: Appropriate insight/judgment; A+O x 3, mood and affect appropriate, recent/remote memory intact

## 2024-04-09 NOTE — CONSULT NOTE ADULT - ASSESSMENT
NOTE INCOMPLETE 57 yo post menopausal female presenting with 1x day of vaginal bleeding and feeling of incomplete voiding since 0230 today. She is hemodynamically stable and afebrile. Labs notable for leukocytosis and elevation in Cr from 1.2 outpatient to 1.4. Exam notable for no evidence of vaginal or cervical bleeding.  On initial assessment, patient had recently voided ~40cc of dark/blood tinged urine in bed pan. After this void, she was straight catheterized for additional 200 cc of dark brown urine. Of note straight catheter needed to be manipulated multiple times in order to ensure flow of urine through catheter suggesting a possible obstruction of the catheter tip at certain angles. After straight catheterization with size 14 Fr straight cath, 16Fr ayon catheter placed with additional drainage of 30cc of dark brown urine.     #Hematuria vs. Dark brown urine  - On exam, there is no evidence of bleeding from vagina or cervix (though cervix was unable to be visualized, there was no blood on glove or speculum or visualized in vaginal vault) suggesting source of bleeding is not from vagina/cervix/uterus.  - Kirk dark brown urine suggestive of possible hematuria vs. choluria  - Diffferential diagnosis of hematuria/choluria is broad, including kidney stone (unlikely as patient denies colicky pain or unilateral flank pain), UTI, or possible malignancy. Though patient has leukocytosis, at this time there is no evidence of pyelonephritis given patient is afebrile without flank pain or CVA tenderness.  - Recommend urologic evaluation to further assess for causes of hematuria vs choluria and recommend appropriate imaging to better assess cause of dark urine  - Recommend urine culture and urinalysis as well as urine electrolytes to further assess possible infection and etiology of elevation in Creatinine.    GYN Onc will continue to follow.    Caridad PGY2 discussed with Gael PGY4 and Dr. Alarcon (GYN Oncology Fellow) 59 yo post menopausal female presenting with 1x day of vaginal bleeding and feeling of incomplete voiding since 0230 today. She is hemodynamically stable and afebrile. Labs notable for leukocytosis and elevation in Cr from 1.2 outpatient to 1.4. Exam notable for no evidence of vaginal or cervical bleeding.  On initial assessment, patient had recently voided ~40cc of dark/blood tinged urine in bed pan. After this void, she was straight catheterized for additional 200 cc of dark brown urine. Of note straight catheter needed to be manipulated multiple times in order to ensure flow of urine through catheter suggesting a possible obstruction of the catheter tip at certain angles. After straight catheterization with size 14 Fr straight cath, 16Fr ayon catheter placed with additional drainage of 30cc of dark brown urine.     #Hematuria vs. Dark brown urine  - On exam, there is no evidence of bleeding from vagina or cervix (though cervix was unable to be visualized, there was no blood on glove or speculum or visualized in vaginal vault) suggesting source of bleeding is not from vagina/cervix/uterus.  - Kirk dark brown urine suggestive of possible hematuria vs. choluria  - Diffferential diagnosis of hematuria/choluria is broad, including kidney stone (unlikely as patient denies colicky pain or unilateral flank pain), UTI, or possible malignancy. Though patient has leukocytosis, at this time there is no evidence of pyelonephritis given patient is afebrile without flank pain or CVA tenderness.  - Recommend urologic evaluation to further assess for causes of hematuria vs choluria and recommend appropriate imaging to better assess cause of dark urine  - Recommend urine culture and urinalysis as well as urine electrolytes to further assess possible infection and etiology of elevation in Creatinine.    #Abdominal pain  - No evidence of acute abdomen and patient's pain is suprapubic, suggesting that her bladder may be a source of pain  - Plan to f/u UA, UCx to r/o urinary tract infection    GYN Onc will continue to follow.    Caridad PGY2 discussed with Gael PGY4 and Dr. Alarcon (GYN Oncology Fellow) 57 yo post menopausal female presenting with 1x day of vaginal bleeding and feeling of incomplete voiding since 0230 today. She is hemodynamically stable and afebrile. Labs notable for leukocytosis and elevation in Cr from 1.2 outpatient to 1.4. Exam notable for no evidence of vaginal or cervical bleeding.  On initial assessment, patient had recently voided ~40cc of dark/blood tinged urine in bed pan. After this void, she was straight catheterized for additional 200 cc of dark brown urine. Of note straight catheter needed to be manipulated multiple times in order to ensure flow of urine through catheter suggesting a possible obstruction of the catheter tip at certain angles. After straight catheterization with size 14 Fr straight cath, 16Fr ayon catheter placed with additional drainage of 30cc of dark brown urine.     #Hematuria vs. Dark brown urine  - On exam, there is no evidence of bleeding from vagina or cervix (though cervix was unable to be visualized, there was no blood on glove or speculum or visualized in vaginal vault) suggesting source of bleeding is not from vagina/cervix/uterus.  - Kirk dark brown urine suggestive of possible hematuria vs. choluria  - Diffferential diagnosis of hematuria/choluria is broad, including kidney stone (unlikely as patient denies colicky pain or unilateral flank pain), UTI, or possible malignancy. Though patient has leukocytosis, at this time there is no evidence of pyelonephritis given patient is afebrile without flank pain or CVA tenderness.  - Recommend urologic evaluation to further assess for causes of hematuria vs choluria and recommend appropriate imaging to better assess cause of dark urine  - Recommend urine culture and urinalysis as well as urine electrolytes to further assess possible infection and etiology of elevation in Creatinine.    #Suprapubic abdominal pain  - Given hematuria vs. choluria, pain is most likely secondary to bladder pain  - Plan to f/u UA, UCx to r/o urinary tract infection    #HTN  - Patient presented to ER hypertensive in setting of known hypertension  - Recommend medicine consultation vs outpatient follow up with PCP if elevated BPs persist    GYN Onc will continue to follow.    Caridad PGY2 discussed with Gael PGY4 and Dr. Alarcon (GYN Oncology Fellow) 59 yo post menopausal female presenting with 1x day of vaginal bleeding and feeling of incomplete voiding since 0230 today. She is hemodynamically stable and afebrile. Labs notable for leukocytosis and elevation in Cr from 1.2 outpatient to 1.4. Exam notable for no evidence of vaginal or cervical bleeding.  On initial assessment, patient had recently voided ~40cc of dark/blood tinged urine in bed pan. After this void, she was straight catheterized for additional 200 cc of dark brown urine. Of note straight catheter needed to be manipulated multiple times in order to ensure flow of urine through catheter suggesting a possible obstruction of the catheter tip at certain angles. After straight catheterization with size 14 Fr straight cath, 16Fr ayon catheter placed with additional drainage of 30cc of dark brown urine.     #Hematuria vs. Dark brown urine  - On exam, there is no evidence of bleeding from vagina or cervix (though cervix was unable to be visualized, there was no blood on glove or speculum or visualized in vaginal vault) suggesting source of bleeding is not from vagina/cervix/uterus.  - Kirk dark brown urine suggestive of possible hematuria vs. choluria  - Diffferential diagnosis of hematuria/choluria is broad, including kidney stone (unlikely as patient denies colicky pain or unilateral flank pain), UTI, or possible malignancy. Though patient has leukocytosis, at this time there is no evidence of pyelonephritis given patient is afebrile without flank pain or CVA tenderness.  - Recommend urologic evaluation to further assess for causes of hematuria vs choluria and recommend appropriate imaging to better assess cause of dark urine  - As choluria can be associated with liver disease, recommend adding LFTs (and medicine/nephrology consultation as indicated)  - Recommend urine culture and urinalysis as well as urine electrolytes to further assess possible infection and etiology of elevation in Creatinine.    #Suprapubic abdominal pain  - Given hematuria vs. choluria, pain is most likely secondary to bladder pain  - Plan to f/u UA, UCx to r/o urinary tract infection    #HTN  - Patient presented to ER hypertensive in setting of known hypertension  - Recommend medicine consultation vs outpatient follow up with PCP if elevated BPs persist    GYN Onc will continue to follow.    Caridad PGY2 discussed with Gael PGY4 and Dr. Alarcon (GYN Oncology Fellow) 59 yo post menopausal female presenting with 1x day of vaginal bleeding and feeling of incomplete voiding since 0230 today. She is hemodynamically stable and afebrile. Labs notable for leukocytosis and elevation in Cr from 1.2 outpatient to 1.4. Exam notable for no evidence of vaginal or cervical bleeding.  On initial assessment, patient had recently voided ~40cc of dark/blood tinged urine in bed pan. After this void, she was straight catheterized for additional 200 cc of dark brown urine. Of note straight catheter needed to be manipulated multiple times in order to ensure flow of urine through catheter suggesting a possible obstruction of the catheter tip at certain angles. After straight catheterization with size 14 Fr straight cath, 16Fr ayon catheter placed with additional drainage of 30cc of dark brown urine.     #Dark brown urine, Hematuria vs. choluria  - On exam, there is no evidence of bleeding from vagina or cervix (though cervix was unable to be visualized, there was no blood on glove or speculum or visualized in vaginal vault) suggesting source of bleeding is not from vagina/cervix/uterus.  - Kirk dark brown urine per ayon catheter suggestive of possible hematuria vs. choluria  - Diffferential diagnosis of hematuria/choluria is broad, including kidney stone (unlikely as patient denies colicky pain or unilateral flank pain), UTI, or possible malignancy. liver disease. Though patient has leukocytosis, at this time there is no evidence of pyelonephritis given patient is afebrile without flank pain or CVA tenderness.  - Recommend urologic evaluation to further assess for causes of hematuria vs choluria and recommend appropriate imaging to better assess cause of dark urine  - As choluria can be associated with liver disease, recommend adding LFTs (and medicine/nephrology consultation as indicated)  - Recommend urine culture and urinalysis as well as urine electrolytes to further assess possible infection and etiology of elevation in Creatinine.    #Suprapubic abdominal pain  - Given grossly abnormal appearance of urine and associated urinary symptoms today, pain is most likely secondary to bladder pain  - Plan to f/u UA, UCx to r/o urinary tract infection    #HTN  - Patient presented to ER hypertensive in setting of known hypertension  - Defer workup of HTN if persistent to ER    GYN Onc will continue to follow.    Caridad PGY2 discussed with Gael PGY4 and Dr. Alarcon (GYN Oncology Fellow)

## 2024-04-09 NOTE — H&P ADULT - HISTORY OF PRESENT ILLNESS
Patient is a 58y old  Female who presents with a chief complaint of     ED HPI:   "58F PMHx HTN, HLD, currently being worked up for post-menopausal bleeding since 1/24 (Dr. Solis/[facility]). Pelvic US at the time showed thickened endometrium. Pt underwent endometrial biopsy & D&C 2/24 without any endometrial tissue. Pt had planned hysteroscopy/D&C later this month. Pt endorses around 230 AM pt woke up w urge to urinate but felt like she couldn't empty bladder & noticed multiple clots in toilet. Pt continued to pass clots multiple times today, unsure if urinating as well. Pt denies HA, lightheadedness, CP, SOB, painful urination."     <<<<<<<  NOTE >>>>>>    Patient is as stated above, patient last saw a Urologist in 2018 and 2019, where she underwent Urodynamics, where she was told she had a small bladder.  Patient also has been suffering from incontinence issues, where she has accidents.  She was taking Ditropan for the incontinence which she said helped, but recently states it has not been working, so she has stopped taking it. She endorses having to wake up every hour to void at night.  She also has been having more urinary incontinence.  Patient denies n/v/f/c, dysuria, incomplete emptying, chest pain, SOB.       Vital Signs Last 24 Hrs  T(C): 37 (09 Apr 2024 19:19), Max: 37.7 (09 Apr 2024 16:06)  T(F): 98.6 (09 Apr 2024 19:19), Max: 99.8 (09 Apr 2024 16:06)  HR: 80 (09 Apr 2024 19:19) (80 - 102)  BP: 108/68 (09 Apr 2024 19:19) (108/68 - 166/96)  BP(mean): --  RR: 18 (09 Apr 2024 19:19) (18 - 20)  SpO2: 96% (09 Apr 2024 19:19) (96% - 96%)    Parameters below as of 09 Apr 2024 19:19  Patient On (Oxygen Delivery Method): room air      I&O's Summary      PE:  Gen: NAD, alert and oriented x 3   Abd: soft nt/nd. Negative CVAT B/L  : Strickland catheter in place draining clear.  DARIANA: Deferred    LABS:                        12.1   15.56 )-----------( 279      ( 09 Apr 2024 17:31 )             39.3     04-09    139  |  102  |  24<H>  ----------------------------<  120<H>  See Note   |  24  |  1.41<H>    Ca    9.3      09 Apr 2024 17:31

## 2024-04-09 NOTE — CONSULT NOTE ADULT - SUBJECTIVE AND OBJECTIVE BOX
Bleeding since 2:30am. Planned for hysteroscopy D+C w/ Dr. Alves at some point.    3/29/24 office note:  59yo postmenopausal since 45yo referred by Dr. Laury Solis for thickened endometrium and post menopausal bleeding. EMB +D+C under anesthesia done last month without any endometrial tissue. Patient reports PMB (started a few years ago) which they thought was related to her copper IUD. They tried to remove IUD but the strings broke, so they offered her a hysterectomy at that time but she declined. IUD ultimately removed 2023 via hysteroscopy) However the bleeding returned in 2024. She noted passing clots occurring every few days which stopped since end of February. Dr. Solis performed EMB, dilation and curettage under anesthesia but pathology showed no evidence of endometrial tissue.  ?  OBhx; 1x , 1x CS, 1 x SAB, 1 x VTOP D+C, 1x ectopic s/p salpingectomy  Gynhx: PMB  PMH: HTN, heart murmur (seeing cardiology)  PSH: CS, open salpingectomy, right wrist surgery, EMB +D+C under anesthesia 2024  meds: valsartan/HCTZ 100/0.25 qd, Carvedilol 6.25 BID, Atorvastatin 10 qd, probiotics  all: lisinopril (lip swelling)  social: denies  Fhx: brother w/ lung cancer dx at 47yo, maternal grandmother w/ breast cancer dx in her 60's)  ?  Last pap smear: 2023 (wnl)  Last mammogram:  (wnl)  colonoscopy:  wnl    Problem  1) Thickened endometrium  2) PMB  ?  Previous Therapy  1) pelvic US 22  a) Uterus 10.4cm, 8mm endometrial stripe  1) D&C, hysteroscopy 22  a) benign squamous and endocervical epithelium  ? 57 yo PMH HTN postmenopausal since age 46 presenting with 1x day of vaginal bleeding and symptoms of incomplete voiding. Patient reports that she woke up at 2am to void, which is normal for her, and she noticed that when she went to void she needed to strain in order to void (which is not her baseline). Additionally, she noticed not only urine in the toilet but also coni red blood in the toilet, which she assumed to be vaginal bleeding. She reports that since 2am throughout the entire day she has been feeling an urge to void that has not been relieved with voiding, though she has been going to bathroom to void every 2 hours. Due to the associated bleeding she has been wearing pads all day, changing the pad q2h. She reports the pads were entirely wet but she stated she could not tell if the pads had blood alone or blood with urine. She endorses associated suprapubic pain, 8/10, which resolved to 6/10 with tylenol/advil (she took tylenol at 4am, and took buprofen in the PM). She endorses discomfort with her voids and the sensation of needing to strain, as well as a feeling of incomplete voiding throughout the day.  At her baseline, she endorses many years of urge incontinence - i.e. starting to leak urine when she thinks about needing to void, or when she starts to go to the bathroom. However at her baseline she feels she empties her bladder completely and never needs to strain.  She denies fevers, chills, back pain, chest pain, shortness of breath.    The patient reports she has had intermittent vaginal bleeding for the last "few years" (she could not remember what age this started). The first intervention for this PMB was the removal of her copper IUD which she had in place from age [age]. This was removed 2023. She had an additional episode of several days of vaginal bleeding without clots and with associated urinary discomfort in January. For this reason she was referred to Dr. Laury Solis for GYN workup. EMB was attempted in the office but was unsuccessful due to patient's body habitus making it difficult to visualize the cervix. She was then taken for EUA with D+C in February .    3/29/24 office note:  57yo postmenopausal since 47yo referred by Dr. Laury Solis for thickened endometrium and post menopausal bleeding. EMB +D+C under anesthesia done last month without any endometrial tissue. Patient reports PMB (started a few years ago) which they thought was related to her copper IUD. They tried to remove IUD but the strings broke, so they offered her a hysterectomy at that time but she declined. IUD ultimately removed 2023 via hysteroscopy) However the bleeding returned in 2024. She noted passing clots occurring every few days which stopped since end of February. Dr. Solis performed EMB, dilation and curettage under anesthesia but pathology showed no evidence of endometrial tissue.  ?  OBhx; 1x , 1x CS, 1 x SAB, 1 x VTOP D+C, 1x ectopic s/p salpingectomy  Gynhx: PMB  PMH: HTN, heart murmur (seeing cardiology)  PSH: CS, open salpingectomy, right wrist surgery, EMB +D+C under anesthesia 2024  meds: valsartan/HCTZ 100/0.25 qd, Carvedilol 6.25 BID, Atorvastatin 10 qd, probiotics  all: lisinopril (lip swelling)  social: denies  Fhx: brother w/ lung cancer dx at 49yo, maternal grandmother w/ breast cancer dx in her 60's)  ?  Last pap smear: 2023 (wnl)  Last mammogram:  (wnl)  colonoscopy:  wnl    Problem  1) Thickened endometrium  2) PMB  ?  Previous Therapy  1) pelvic US 22  a) Uterus 10.4cm, 8mm endometrial stripe  1) D&C, hysteroscopy 22  a) benign squamous and endocervical epithelium  ? 57 yo PMH HTN postmenopausal since age 46 presenting with 1x day of vaginal bleeding and symptoms of incomplete voiding. Patient reports that she woke up at 2am to void, which is normal for her, and she noticed that when she went to void she needed to strain in order to void (which is not her baseline). Additionally, she noticed not only urine in the toilet but also coni red blood in the toilet, which she assumed to be vaginal bleeding. She reports that since 2am throughout the entire day she has been feeling an urge to void that has not been relieved with voiding, though she has been going to bathroom to void every 2 hours. Due to the associated bleeding she has been wearing pads all day, changing the pad q2h. She reports the pads were entirely wet but she stated she could not tell if the pads had blood alone or blood with urine. She states she passed one blood today just prior to evaluation by MD, size of [employer] ball. She endorses associated suprapubic pain, 8/10, which resolved to 6/10 with tylenol/advil (she took tylenol at 4am, and took buprofen in the PM). She endorses discomfort with her voids and the sensation of needing to strain, as well as a feeling of incomplete voiding throughout the day.  At her baseline, she endorses many years of urge incontinence - i.e. starting to leak urine when she thinks about needing to void, or when she starts to go to the bathroom. However at her baseline she feels she empties her bladder completely and never needs to strain.  She denies fevers, chills, back pain, chest pain, shortness of breath.    The patient reports she has had intermittent vaginal bleeding for the last "few years" (she could not remember what age this started). The first intervention for this PMB was the removal of her copper IUD (hysteroscopically, after unsuccessful removal vaginally in office), which she had in place from age [age]. This was removed 2023. She had an additional episode of several days of vaginal bleeding with clots (patient could not state how many) and with associated urinary discomfort in January. For this reason she was referred to Dr. Laury Solis for GYN workup. EMB was attempted in the office but was unsuccessful due to patient's body habitus making it difficult to visualize the cervix. She was then taken for EUA with SAMMY+C in 2024. The procedure was uncomplicated however pathology was unsatisfactory as the "endometrial currettings" was "benign fragments of squamous and endocervical epithelium". Due to inadequate sampling from D+C she was referred to GYN oncology for further evaluation. She was scheduled for hysteroscopy D+C this month, however presented to ER today due to acute re-presentation of symptoms.    She reports she was evaluated by urologist in 2018 for symptoms of overactive bladder, endorses a study (possibly urodynamics), patient states findings were ntoable for a "small bladder" and she was offered medication which she refused. She was Rx'd a trial of oxybutinin at this time which briefly helped but her symptoms became refractory to oxybutinin in . She was never diagnosed with hematuria and denies ever having cystoscopy.   ?  OBhx; 1x , 1x CS, 1 x SAB, 1 x VTOP D+C, 1x ectopic s/p open Right salpingectomy  Gynhx: menopause age 46, not currently sexually active, denies history of STIs  PMH: HTN, heart murmur (seeing cardiology). Denies personal history of any cancer.  PSH: C section, open R salpingectomy, right wrist surgery with +metal implants, EMB +D+C under anesthesia 2024  meds: valsartan/HCTZ 100/0.25 qd, Carvedilol 6.25 BID, Atorvastatin 10 qd  all: lisinopril (angioedema)  social: denies alcohol, drug, cigarette use    Fhx: brother w/ lung cancer dx at 49yo, maternal grandmother w/ breast cancer dx in her 60's)  ?  Last pap smear: 2023 (wnl)  Last mammogram:  (wnl)  colonoscopy:  wnl    Gen: Comfortable appearing, NAD  Abdomen: Obese, soft, no rebound/guarding, mild suprapubic TTP.   Back: No CVAT  Ext: 2+ b/l pedal edema to shin, no calf tenderness, no skin color changes  Pulm: Comfortable on room air  SSE: Unable to visualize cervix due to redundant vaginal tissue, no blood in vaginal vault visualized or on speculum once removed  bimanual: Normal cervix, no masses palpated, no CMT. No blood on glove when removed. Normal external vagina.   : Dark colored urine drained from catheter with +small pieces of tissue resembling small blood clots. Normal appearing external urethral meatus without masses.    LABS:                        12.1   15.56 )-----------( 279      ( 2024 17:31 )             39.3     04    139  |  102  |  24<H>  ----------------------------<  120<H>  See Note   |  24  |  1.41<H>    Ca    9.3      2024 17:31        Urinalysis Basic - ( 2024 17:31 )    Color: x / Appearance: x / SG: x / pH: x  Gluc: 120 mg/dL / Ketone: x  / Bili: x / Urobili: x   Blood: x / Protein: x / Nitrite: x   Leuk Esterase: x / RBC: x / WBC x   Sq Epi: x / Non Sq Epi: x / Bacteria: x      ? 57 yo PMH HTN postmenopausal since age 46 presenting with 1x day of vaginal bleeding and symptoms of incomplete voiding. Patient reports that she woke up at 2am to void, which is normal for her, and she noticed that when she went to void she needed to strain in order to void (which is not her baseline). Additionally, she noticed not only urine in the toilet but also coni red blood in the toilet, which she assumed to be vaginal bleeding. She reports that since 2am throughout the entire day she has been feeling an urge to void that has not been relieved with voiding, though she has been going to bathroom to void every 2 hours. Due to the associated bleeding she has been wearing pads all day, changing the pad q2h. She reports the pads were entirely wet but she stated she could not tell if the pads had blood alone or blood with urine. She states she passed one blood today just prior to evaluation by MD, size of [employer] ball. She endorses associated suprapubic pain, 8/10, which resolved to 6/10 with tylenol/advil (she took tylenol at 4am, and took buprofen in the PM). She endorses discomfort with her voids and the sensation of needing to strain, as well as a feeling of incomplete voiding throughout the day.  At her baseline, she endorses many years of urge incontinence - i.e. starting to leak urine when she thinks about needing to void, or when she starts to go to the bathroom. However at her baseline she feels she empties her bladder completely and never needs to strain.  She denies fevers, chills, back pain, chest pain, shortness of breath.    The patient reports she has had intermittent vaginal bleeding for the last "few years" (she could not remember what age this started). The first intervention for this PMB was the removal of her copper IUD (hysteroscopically, after unsuccessful removal vaginally in office), which she had in place from age [age]. This was removed 2023. She had an additional episode of several days of vaginal bleeding with clots (patient could not state how many) and with associated urinary discomfort in January. For this reason she was referred to Dr. Laury Solis for GYN workup. EMB was attempted in the office but was unsuccessful due to patient's body habitus making it difficult to visualize the cervix. She was then taken for EUA with SAMMY+C in 2024. The procedure was uncomplicated however pathology was unsatisfactory as the "endometrial currettings" was "benign fragments of squamous and endocervical epithelium". Due to inadequate sampling from D+C she was referred to GYN oncology for further evaluation. She was scheduled for hysteroscopy D+C this month, however presented to ER today due to acute re-presentation of symptoms.    She reports she was evaluated by urologist in 2018 for symptoms of overactive bladder, endorses a study (possibly urodynamics), patient states findings were ntoable for a "small bladder" and she was offered medication which she refused. She was Rx'd a trial of oxybutinin at this time which briefly helped but her symptoms became refractory to oxybutinin in . She was never diagnosed with hematuria and denies ever having cystoscopy.   ?  OBhx; 1x , 1x CS, 1 x SAB, 1 x VTOP D+C, 1x ectopic s/p open Right salpingectomy  Gynhx: menopause age 46, not currently sexually active, denies history of STIs  PMH: HTN, heart murmur (seeing cardiology). Denies personal history of any cancer.  PSH: C section, open R salpingectomy, right wrist surgery with +metal implants, EMB +D+C under anesthesia 2024  meds: valsartan/HCTZ 100/0.25 qd, Carvedilol 6.25 BID, Atorvastatin 10 qd  all: lisinopril (angioedema)  social: denies alcohol, drug, cigarette use    Fhx: brother w/ lung cancer dx at 47yo, maternal grandmother w/ breast cancer dx in her 60's)  ?  Last pap smear: 2023 (wnl)  Last mammogram:  (wnl)  colonoscopy:  wnl    Gen: Comfortable appearing, NAD  Abdomen: Obese, soft, no rebound/guarding, mild suprapubic TTP.   Back: No CVAT  Ext: 2+ b/l pedal edema to shin, no calf tenderness, no skin color changes  Pulm: Comfortable on room air  SSE: Unable to visualize cervix due to redundant vaginal tissue, no blood in vaginal vault visualized or on speculum once removed. Normal internal vaginal mucosa visualized.  Bimanual: Normal cervix, no masses palpated, no CMT. No blood on glove when removed. Normal external vagina.   : Dark brown opaque urine drained from catheter with +small pieces of tissue <2mm thick, also dark brown. Normal appearing external urethral meatus without masses.    LABS:                        12.1   15.56 )-----------( 279      ( 2024 17:31 )             39.3     04-    139  |  102  |  24<H>  ----------------------------<  120<H>  See Note   |  24  |  1.41<H>    Ca    9.3      2024 17:31        Urinalysis Basic - ( 2024 17:31 )    Color: x / Appearance: x / SG: x / pH: x  Gluc: 120 mg/dL / Ketone: x  / Bili: x / Urobili: x   Blood: x / Protein: x / Nitrite: x   Leuk Esterase: x / RBC: x / WBC x   Sq Epi: x / Non Sq Epi: x / Bacteria: x      ? 57 yo PMH HTN postmenopausal since age 46 presenting with 1x day of vaginal bleeding and symptoms of incomplete voiding. Patient reports that she woke up at 2am to void, which is normal for her, and she noticed that when she went to void she needed to strain in order to void (which is not her baseline). Additionally, she noticed not only urine in the toilet but also coni red blood in the toilet, which she assumed to be vaginal bleeding. She reports that since 2am throughout the entire day she has been feeling an urge to void that has not been relieved with voiding, despite voiding every 2 hours. Due to the associated bleeding she has been wearing pads all day, changing the pad q2h. She reports the pads were entirely wet but she stated she could not tell if the pads had blood alone or blood with urine. She states she passed one blood clot today just prior to evaluation by MD, size of [facility] ball. She endorses associated suprapubic pain, 8/10, which resolved to 6/10 with tylenol/advil (she took tylenol at 4am, and took buprofen in the PM). She endorses discomfort with her voids and the sensation of needing to strain, as well as a feeling of incomplete voiding throughout the day.  At her baseline, she endorses many years of urge incontinence - i.e. starting to leak urine when she thinks about needing to void. However at her baseline she feels she empties her bladder completely and never needs to strain.  She denies fevers, chills, back pain, chest pain, shortness of breath.    The patient reports she has had intermittent vaginal bleeding for the last "few years" (she could not remember what age this started). The first intervention for this PMB was the removal of her copper IUD (hysteroscopically, after unsuccessful removal vaginally in office), which she had in place from age [age]. This was removed 2023. She had an additional episode of several days of vaginal bleeding with clots (patient could not state how many) and with associated urinary discomfort in January. For this reason she was referred to Dr. Laury Solis for GYN workup. EMB was attempted in the office but was unsuccessful due to patient's body habitus making it difficult to visualize the cervix. She was then taken for EUA with D+C in 2024. The procedure was uncomplicated however pathology was unsatisfactory as the "endometrial currettings" were "benign fragments of squamous and endocervical epithelium" without adequate endometrial sampling. Due to inadequate sampling from D+C she was referred to GYN oncology for further evaluation. She was scheduled for hysteroscopy D+C this month, however presented to ER today due to acute re-presentation of symptoms.    She reports she was evaluated by urologist in 2018 for symptoms of overactive bladder, endorses a study (possibly urodynamics), patient states findings were ntoable for a "small bladder" and she was offered medication which she refused. She was Rx'd a trial of oxybutinin at this time which briefly helped but her symptoms became refractory to oxybutinin in . She was never diagnosed with hematuria and denies ever having cystoscopy.   ?  OBhx; 1x , 1x CS, 1 x SAB, 1 x VTOP D+C, 1x ectopic s/p open Right salpingectomy  Gynhx: menopause age 46, not currently sexually active, denies history of STIs  PMH: HTN, heart murmur (seeing cardiology). Denies personal history of any cancer.  PSH: C section, open R salpingectomy, right wrist surgery with +metal implants, EMB +D+C under anesthesia 2024  meds: valsartan/HCTZ 100/0.25 qd, Carvedilol 6.25 BID, Atorvastatin 10 qd  all: lisinopril (angioedema)  social: denies alcohol, drug, cigarette use    Fhx: brother w/ lung cancer dx at 47yo, maternal grandmother w/ breast cancer dx in her 60's)  ?  Last pap smear: 2023 (wnl)  Last mammogram:  (wnl)  colonoscopy:  wnl    Gen: Comfortable appearing, NAD  Abdomen: Obese, soft, no rebound/guarding, mild suprapubic TTP.   Back: No CVAT  Ext: 2+ b/l pedal edema to shin, no calf tenderness, no skin color changes  Pulm: Comfortable on room air  SSE: Unable to visualize cervix due to redundant vaginal tissue, no blood in vaginal vault visualized or on speculum once removed. Normal internal vaginal mucosa visualized.  Bimanual: Normal cervix, no masses palpated, no CMT. No blood on glove when removed. Normal external vagina.   : Dark brown opaque urine drained from catheter with +small pieces dark brown of tissue <2mm thick. Normal appearing external urethral meatus without masses.    LABS:                        12.1   15.56 )-----------( 279      ( 2024 17:31 )             39.3     04    139  |  102  |  24<H>  ----------------------------<  120<H>  See Note   |  24  |  1.41<H>    Ca    9.3      2024 17:31        Urinalysis Basic - ( 2024 17:31 )    Color: x / Appearance: x / SG: x / pH: x  Gluc: 120 mg/dL / Ketone: x  / Bili: x / Urobili: x   Blood: x / Protein: x / Nitrite: x   Leuk Esterase: x / RBC: x / WBC x   Sq Epi: x / Non Sq Epi: x / Bacteria: x      ?

## 2024-04-09 NOTE — ED PROVIDER NOTE - CLINICAL SUMMARY MEDICAL DECISION MAKING FREE TEXT BOX
58F PMHx HTN, HLD, currently being worked up for post-menopausal bleeding since 1/24 (Dr. Solis/Shyla). Pt endorses around 230 AM pt woke up w urge to urinate but felt like she couldn't empty bladder & noticed multiple clots in toilet. Pt continued to pass clots multiple times today, unsure if urinating as well. Pt denies HA, lightheadedness, CP, SOB, painful urination. Bladder scan, CBC/BMP, GYN consult 58F PMHx HTN, HLD, currently being worked up for post-menopausal bleeding since 1/24 (Dr. Solis/Shyla). Pt endorses around 230 AM pt woke up w urge to urinate but felt like she couldn't empty bladder & noticed multiple clots in toilet. Pt continued to pass clots multiple times today, unsure if urinating as well. Pt denies HA, lightheadedness, CP, SOB, painful urination. Bladder scan, CBC/BMP, UA, GYN consult 58F PMHx HTN, HLD, currently being worked up for post-menopausal bleeding since 1/24 (Dr. Solis/Shyla). Pt endorses around 230 AM pt woke up w urge to urinate but felt like she couldn't empty bladder & noticed multiple clots in toilet. Pt continued to pass clots multiple times today, unsure if urinating as well. Pt denies HA, lightheadedness, CP, SOB, painful urination. Pt examined by GYN, no blood in vaginal vault, straight cath w 200 cc cola-colored urine, ayon placed w dark, bloody urine. Uro consulted, pending CT renal stone w/u 58F IMP- Gross hematuria w fever, elev ebc  CT- shows questionable renal mass  IV abx given PMHx HTN, HLD,. Uro consulted, pending dispo

## 2024-04-09 NOTE — ED PROVIDER NOTE - ATTENDING CONTRIBUTION TO CARE
58F w suprapubic pain, urgency, frequency, dysuria no flank pain no f/c no n/v  PMHx HTN, HLD, currently being worked up for post-menopausal bleeding since 1/24 (Dr. Solis/Yudy). Pelvic US at the time showed thickened endometrium. Pt underwent endometrial biopsy & D&C 2/24 without any endometrial tissue. Pt had planned hysteroscopy/D&C later this month. Pt endorses around 230 AM pt woke up w urge to urinate but felt like she couldn't empty bladder & noticed multiple clots in toilet. Pt continued to pass clots multiple times today, unsure if urinating as well  vss  s1s2 lungs cta bl  abd soft nt nd +bs  ext no c/c/e  IMP- Gross hematuria w fever, elev ebc  CT- shows questionable renal mass  IV abx given  Uro eval

## 2024-04-09 NOTE — H&P ADULT - NSHPLABSRESULTS_GEN_ALL_CORE
PROCEDURE DATE: 04/09/2024        INTERPRETATION: CLINICAL INFORMATION: Hematuria    COMPARISON: None.    CONTRAST/COMPLICATIONS:  IV Contrast:  Oral Contrast:  Complications:    PROCEDURE:  CT of the Abdomen and Pelvis was performed.  Sagittal and coronal reformats were performed.    FINDINGS:  LOWER CHEST: Within normal limits.    LIVER: Segment 4 cyst.  BILE DUCTS: Normal caliber.  GALLBLADDER: Within normal limits.  SPLEEN: Within normal limits.  PANCREAS: Within normal limits.  ADRENALS: Within normal limits.  KIDNEYS/URETERS: Evaluation is limited by lack of IV contrast. Asymmetric enlargement of the right kidney with lobulated isodense lower pole mass like region measuring 7.0 x 5.0 cm. There is high density in the proximal right renal collecting system. No nephrolithiasis. Asymmetric right perinephric stranding. Left kidney within normal limits..    BLADDER: High density in the bladder lumen which is decompressed by Strickland catheter..  REPRODUCTIVE ORGANS: Uterus and adnexa within normal limits.    BOWEL: Diverticulosis coli. No bowel obstruction. Appendix is normal.  PERITONEUM: No ascites.  VESSELS: Within normal limits.  RETROPERITONEUM/LYMPH NODES: No lymphadenopathy.  ABDOMINAL WALL: Within normal limits.  BONES: Degenerative changes.    IMPRESSION:  Ill-defined mass in the lower pole the right kidney. Evaluation is limited by lack of intravenous contrast. Differential includes renal neoplasm versus abscess. High density in the collecting system and bladder likely reflecting hemorrhagic products. Renal mass protocol CT or contrast-enhanced MRI is recommended for better evaluation as well as correlation with urine cytology and urinalysis.        --- End of Report ---            KELSI SPICER MD; Attending Radiologist  This document has been electronically signed. Apr 9 2024 7:19PM

## 2024-04-09 NOTE — ED ADULT TRIAGE NOTE - CHIEF COMPLAINT QUOTE
LNMP 12 years ago, c/o vaginal bleeding since last night. "I also can't urinate - I'm incontinent to the poise pads at baseline but now I am not going since last night. I have a uterine biopsy 4/16 and my one in February was inconclusive." Denies dizziness, weakness, CP, SOB.

## 2024-04-09 NOTE — ED ADULT NURSE REASSESSMENT NOTE - NS ED NURSE REASSESS COMMENT FT1
GYN team inserted 14F intermittent catheter with an output of 400ML of red urine. GYN team then inserted a 16F indwelling urinary catheter with an output of 50mL of red urine.

## 2024-04-09 NOTE — ED PROVIDER NOTE - NSICDXPASTMEDICALHX_GEN_ALL_CORE_FT
PAST MEDICAL HISTORY:  Abnormal vaginal bleeding     Heart murmur     HTN (hypertension)     Obesity

## 2024-04-09 NOTE — CONSULT NOTE ADULT - SUBJECTIVE AND OBJECTIVE BOX
59 yo postmenopausal (menopause age 46) para 2 PMH HTN presenting to ER with 1x day of vaginal bleeding associated with sensation of incomplete voiding.    OB/GYN Hx:  Last PAP smear:     PMHx:   SHx:  Meds:   Allergies:     Social hx:     PHYSICAL EXAM:   Vital Signs Last 24 Hrs  T(C): 37.7 (09 Apr 2024 16:06), Max: 37.7 (09 Apr 2024 16:06)  T(F): 99.8 (09 Apr 2024 16:06), Max: 99.8 (09 Apr 2024 16:06)  HR: 102 (09 Apr 2024 16:06) (102 - 102)  BP: 166/96 (09 Apr 2024 16:06) (166/96 - 166/96)  BP(mean): --  RR: 20 (09 Apr 2024 16:06) (20 - 20)  SpO2: 96% (09 Apr 2024 16:06) (96% - 96%)    Parameters below as of 09 Apr 2024 16:06  Patient On (Oxygen Delivery Method): room air        **************************  Constitutional: Alert & Oriented x3, No acute distress  Respiratory: Clear to ausculation bilaterally; no wheezing, rhonchi, or crackles  Cardiovascular: regular rate and rhythm, no murmurs, or gallops  Gastrointestinal: soft, non tender, positive bowel sounds, no rebound or guarding   Pelvic exam:   Extremities: no calf tenderness or swelling    LABS:                  RADIOLOGY & ADDITIONAL STUDIES:

## 2024-04-10 ENCOUNTER — RESULT REVIEW (OUTPATIENT)
Age: 59
End: 2024-04-10

## 2024-04-10 LAB
ANION GAP SERPL CALC-SCNC: 10 MMOL/L — SIGNIFICANT CHANGE UP (ref 5–17)
APPEARANCE UR: CLEAR — SIGNIFICANT CHANGE UP
APTT BLD: 29 SEC — SIGNIFICANT CHANGE UP (ref 24.5–35.6)
BACTERIA # UR AUTO: NEGATIVE /HPF — SIGNIFICANT CHANGE UP
BILIRUB UR-MCNC: NEGATIVE — SIGNIFICANT CHANGE UP
BUN SERPL-MCNC: 23 MG/DL — SIGNIFICANT CHANGE UP (ref 7–23)
CALCIUM SERPL-MCNC: 8.9 MG/DL — SIGNIFICANT CHANGE UP (ref 8.4–10.5)
CAST: 2 /LPF — SIGNIFICANT CHANGE UP (ref 0–4)
CHLORIDE SERPL-SCNC: 104 MMOL/L — SIGNIFICANT CHANGE UP (ref 96–108)
CO2 SERPL-SCNC: 25 MMOL/L — SIGNIFICANT CHANGE UP (ref 22–31)
COLOR SPEC: ABNORMAL
CREAT SERPL-MCNC: 1.52 MG/DL — HIGH (ref 0.5–1.3)
DIFF PNL FLD: ABNORMAL
EGFR: 39 ML/MIN/1.73M2 — LOW
GLUCOSE SERPL-MCNC: 127 MG/DL — HIGH (ref 70–99)
GLUCOSE UR QL: NEGATIVE MG/DL — SIGNIFICANT CHANGE UP
HCT VFR BLD CALC: 34.9 % — SIGNIFICANT CHANGE UP (ref 34.5–45)
HCV AB S/CO SERPL IA: 0.04 S/CO — SIGNIFICANT CHANGE UP
HCV AB SERPL-IMP: SIGNIFICANT CHANGE UP
HGB BLD-MCNC: 10.7 G/DL — LOW (ref 11.5–15.5)
INR BLD: 1.13 — SIGNIFICANT CHANGE UP (ref 0.85–1.18)
KETONES UR-MCNC: NEGATIVE MG/DL — SIGNIFICANT CHANGE UP
LEUKOCYTE ESTERASE UR-ACNC: ABNORMAL
MCHC RBC-ENTMCNC: 27.2 PG — SIGNIFICANT CHANGE UP (ref 27–34)
MCHC RBC-ENTMCNC: 30.7 GM/DL — LOW (ref 32–36)
MCV RBC AUTO: 88.8 FL — SIGNIFICANT CHANGE UP (ref 80–100)
NITRITE UR-MCNC: NEGATIVE — SIGNIFICANT CHANGE UP
NRBC # BLD: 0 /100 WBCS — SIGNIFICANT CHANGE UP (ref 0–0)
PH UR: 5.5 — SIGNIFICANT CHANGE UP (ref 5–8)
PLATELET # BLD AUTO: 261 K/UL — SIGNIFICANT CHANGE UP (ref 150–400)
POTASSIUM SERPL-MCNC: 3.9 MMOL/L — SIGNIFICANT CHANGE UP (ref 3.5–5.3)
POTASSIUM SERPL-SCNC: 3.9 MMOL/L — SIGNIFICANT CHANGE UP (ref 3.5–5.3)
PROT UR-MCNC: 30 MG/DL
PROTHROM AB SERPL-ACNC: 12.8 SEC — SIGNIFICANT CHANGE UP (ref 9.5–13)
RBC # BLD: 3.93 M/UL — SIGNIFICANT CHANGE UP (ref 3.8–5.2)
RBC # FLD: 14.7 % — HIGH (ref 10.3–14.5)
RBC CASTS # UR COMP ASSIST: 422 /HPF — HIGH (ref 0–4)
SODIUM SERPL-SCNC: 139 MMOL/L — SIGNIFICANT CHANGE UP (ref 135–145)
SP GR SPEC: >1.03 — HIGH (ref 1–1.03)
SQUAMOUS # UR AUTO: 2 /HPF — SIGNIFICANT CHANGE UP (ref 0–5)
UROBILINOGEN FLD QL: 0.2 MG/DL — SIGNIFICANT CHANGE UP (ref 0.2–1)
WBC # BLD: 16.34 K/UL — HIGH (ref 3.8–10.5)
WBC # FLD AUTO: 16.34 K/UL — HIGH (ref 3.8–10.5)
WBC UR QL: 39 /HPF — HIGH (ref 0–5)

## 2024-04-10 PROCEDURE — 71250 CT THORAX DX C-: CPT | Mod: 26

## 2024-04-10 RX ORDER — SODIUM CHLORIDE 9 MG/ML
3 INJECTION INTRAMUSCULAR; INTRAVENOUS; SUBCUTANEOUS EVERY 8 HOURS
Refills: 0 | Status: DISCONTINUED | OUTPATIENT
Start: 2024-04-10 | End: 2024-04-12

## 2024-04-10 RX ORDER — ATORVASTATIN CALCIUM 80 MG/1
10 TABLET, FILM COATED ORAL AT BEDTIME
Refills: 0 | Status: DISCONTINUED | OUTPATIENT
Start: 2024-04-10 | End: 2024-04-12

## 2024-04-10 RX ORDER — HEPARIN SODIUM 5000 [USP'U]/ML
7500 INJECTION INTRAVENOUS; SUBCUTANEOUS EVERY 8 HOURS
Refills: 0 | Status: DISCONTINUED | OUTPATIENT
Start: 2024-04-10 | End: 2024-04-11

## 2024-04-10 RX ORDER — CARVEDILOL PHOSPHATE 80 MG/1
6.25 CAPSULE, EXTENDED RELEASE ORAL EVERY 12 HOURS
Refills: 0 | Status: DISCONTINUED | OUTPATIENT
Start: 2024-04-10 | End: 2024-04-12

## 2024-04-10 RX ORDER — LOSARTAN POTASSIUM 100 MG/1
100 TABLET, FILM COATED ORAL DAILY
Refills: 0 | Status: DISCONTINUED | OUTPATIENT
Start: 2024-04-10 | End: 2024-04-12

## 2024-04-10 RX ORDER — CEFTRIAXONE 500 MG/1
1000 INJECTION, POWDER, FOR SOLUTION INTRAMUSCULAR; INTRAVENOUS EVERY 24 HOURS
Refills: 0 | Status: DISCONTINUED | OUTPATIENT
Start: 2024-04-10 | End: 2024-04-12

## 2024-04-10 RX ORDER — ACETAMINOPHEN 500 MG
650 TABLET ORAL EVERY 6 HOURS
Refills: 0 | Status: DISCONTINUED | OUTPATIENT
Start: 2024-04-10 | End: 2024-04-12

## 2024-04-10 RX ORDER — SODIUM CHLORIDE 9 MG/ML
1000 INJECTION INTRAMUSCULAR; INTRAVENOUS; SUBCUTANEOUS
Refills: 0 | Status: DISCONTINUED | OUTPATIENT
Start: 2024-04-10 | End: 2024-04-10

## 2024-04-10 RX ORDER — OXYBUTYNIN CHLORIDE 5 MG
5 TABLET ORAL EVERY 8 HOURS
Refills: 0 | Status: DISCONTINUED | OUTPATIENT
Start: 2024-04-10 | End: 2024-04-12

## 2024-04-10 RX ADMIN — Medication 5 MILLIGRAM(S): at 21:30

## 2024-04-10 RX ADMIN — SODIUM CHLORIDE 3 MILLILITER(S): 9 INJECTION INTRAMUSCULAR; INTRAVENOUS; SUBCUTANEOUS at 21:39

## 2024-04-10 RX ADMIN — CEFTRIAXONE 100 MILLIGRAM(S): 500 INJECTION, POWDER, FOR SOLUTION INTRAMUSCULAR; INTRAVENOUS at 19:00

## 2024-04-10 RX ADMIN — Medication 650 MILLIGRAM(S): at 21:48

## 2024-04-10 RX ADMIN — SODIUM CHLORIDE 100 MILLILITER(S): 9 INJECTION INTRAMUSCULAR; INTRAVENOUS; SUBCUTANEOUS at 01:39

## 2024-04-10 RX ADMIN — ATORVASTATIN CALCIUM 10 MILLIGRAM(S): 80 TABLET, FILM COATED ORAL at 21:30

## 2024-04-10 RX ADMIN — CARVEDILOL PHOSPHATE 6.25 MILLIGRAM(S): 80 CAPSULE, EXTENDED RELEASE ORAL at 01:39

## 2024-04-10 RX ADMIN — HEPARIN SODIUM 7500 UNIT(S): 5000 INJECTION INTRAVENOUS; SUBCUTANEOUS at 21:30

## 2024-04-10 RX ADMIN — SODIUM CHLORIDE 3 MILLILITER(S): 9 INJECTION INTRAMUSCULAR; INTRAVENOUS; SUBCUTANEOUS at 13:57

## 2024-04-10 RX ADMIN — CARVEDILOL PHOSPHATE 6.25 MILLIGRAM(S): 80 CAPSULE, EXTENDED RELEASE ORAL at 13:08

## 2024-04-10 RX ADMIN — Medication 650 MILLIGRAM(S): at 20:48

## 2024-04-11 LAB
ANION GAP SERPL CALC-SCNC: 11 MMOL/L — SIGNIFICANT CHANGE UP (ref 5–17)
BLD GP AB SCN SERPL QL: NEGATIVE — SIGNIFICANT CHANGE UP
BUN SERPL-MCNC: 23 MG/DL — SIGNIFICANT CHANGE UP (ref 7–23)
CALCIUM SERPL-MCNC: 8.8 MG/DL — SIGNIFICANT CHANGE UP (ref 8.4–10.5)
CHLORIDE SERPL-SCNC: 105 MMOL/L — SIGNIFICANT CHANGE UP (ref 96–108)
CO2 SERPL-SCNC: 25 MMOL/L — SIGNIFICANT CHANGE UP (ref 22–31)
CREAT SERPL-MCNC: 1.6 MG/DL — HIGH (ref 0.5–1.3)
EGFR: 37 ML/MIN/1.73M2 — LOW
GLUCOSE SERPL-MCNC: 134 MG/DL — HIGH (ref 70–99)
HCT VFR BLD CALC: 35.8 % — SIGNIFICANT CHANGE UP (ref 34.5–45)
HGB BLD-MCNC: 10.7 G/DL — LOW (ref 11.5–15.5)
MAGNESIUM SERPL-MCNC: 2.3 MG/DL — SIGNIFICANT CHANGE UP (ref 1.6–2.6)
MCHC RBC-ENTMCNC: 27.2 PG — SIGNIFICANT CHANGE UP (ref 27–34)
MCHC RBC-ENTMCNC: 29.9 GM/DL — LOW (ref 32–36)
MCV RBC AUTO: 90.9 FL — SIGNIFICANT CHANGE UP (ref 80–100)
NRBC # BLD: 0 /100 WBCS — SIGNIFICANT CHANGE UP (ref 0–0)
PHOSPHATE SERPL-MCNC: 3.4 MG/DL — SIGNIFICANT CHANGE UP (ref 2.5–4.5)
PLATELET # BLD AUTO: 252 K/UL — SIGNIFICANT CHANGE UP (ref 150–400)
POTASSIUM SERPL-MCNC: 3.9 MMOL/L — SIGNIFICANT CHANGE UP (ref 3.5–5.3)
POTASSIUM SERPL-SCNC: 3.9 MMOL/L — SIGNIFICANT CHANGE UP (ref 3.5–5.3)
RBC # BLD: 3.94 M/UL — SIGNIFICANT CHANGE UP (ref 3.8–5.2)
RBC # FLD: 14.5 % — SIGNIFICANT CHANGE UP (ref 10.3–14.5)
RH IG SCN BLD-IMP: POSITIVE — SIGNIFICANT CHANGE UP
SODIUM SERPL-SCNC: 141 MMOL/L — SIGNIFICANT CHANGE UP (ref 135–145)
WBC # BLD: 12.94 K/UL — HIGH (ref 3.8–10.5)
WBC # FLD AUTO: 12.94 K/UL — HIGH (ref 3.8–10.5)

## 2024-04-11 PROCEDURE — 99222 1ST HOSP IP/OBS MODERATE 55: CPT

## 2024-04-11 PROCEDURE — 99232 SBSQ HOSP IP/OBS MODERATE 35: CPT

## 2024-04-11 RX ORDER — SODIUM CHLORIDE 9 MG/ML
1000 INJECTION INTRAMUSCULAR; INTRAVENOUS; SUBCUTANEOUS
Refills: 0 | Status: DISCONTINUED | OUTPATIENT
Start: 2024-04-11 | End: 2024-04-12

## 2024-04-11 RX ORDER — SODIUM CHLORIDE 9 MG/ML
1000 INJECTION INTRAMUSCULAR; INTRAVENOUS; SUBCUTANEOUS
Refills: 0 | Status: DISCONTINUED | OUTPATIENT
Start: 2024-04-11 | End: 2024-04-11

## 2024-04-11 RX ADMIN — Medication 5 MILLIGRAM(S): at 09:13

## 2024-04-11 RX ADMIN — LOSARTAN POTASSIUM 100 MILLIGRAM(S): 100 TABLET, FILM COATED ORAL at 05:45

## 2024-04-11 RX ADMIN — HEPARIN SODIUM 7500 UNIT(S): 5000 INJECTION INTRAVENOUS; SUBCUTANEOUS at 05:52

## 2024-04-11 RX ADMIN — SODIUM CHLORIDE 3 MILLILITER(S): 9 INJECTION INTRAMUSCULAR; INTRAVENOUS; SUBCUTANEOUS at 13:11

## 2024-04-11 RX ADMIN — SODIUM CHLORIDE 3 MILLILITER(S): 9 INJECTION INTRAMUSCULAR; INTRAVENOUS; SUBCUTANEOUS at 05:55

## 2024-04-11 RX ADMIN — ATORVASTATIN CALCIUM 10 MILLIGRAM(S): 80 TABLET, FILM COATED ORAL at 22:34

## 2024-04-11 RX ADMIN — CARVEDILOL PHOSPHATE 6.25 MILLIGRAM(S): 80 CAPSULE, EXTENDED RELEASE ORAL at 05:45

## 2024-04-11 RX ADMIN — HEPARIN SODIUM 7500 UNIT(S): 5000 INJECTION INTRAVENOUS; SUBCUTANEOUS at 22:33

## 2024-04-11 RX ADMIN — HEPARIN SODIUM 7500 UNIT(S): 5000 INJECTION INTRAVENOUS; SUBCUTANEOUS at 13:31

## 2024-04-11 RX ADMIN — SODIUM CHLORIDE 3 MILLILITER(S): 9 INJECTION INTRAMUSCULAR; INTRAVENOUS; SUBCUTANEOUS at 22:42

## 2024-04-11 RX ADMIN — CARVEDILOL PHOSPHATE 6.25 MILLIGRAM(S): 80 CAPSULE, EXTENDED RELEASE ORAL at 18:24

## 2024-04-11 RX ADMIN — CEFTRIAXONE 100 MILLIGRAM(S): 500 INJECTION, POWDER, FOR SOLUTION INTRAMUSCULAR; INTRAVENOUS at 18:24

## 2024-04-11 NOTE — CONSULT NOTE ADULT - ATTENDING COMMENTS
58F PMHx morbid obesity ( BMI 61), HTN, HLD, hx murmur/moderate AS on TTE presenting for hematuria found to have 8cm R kidney mass pending cystoscopy and stent. Med consult for pre-op clearance and co-management    Chart, labs reviewed. outside TTE report reviewed. Pt interviewed and examined with Dr. Gao.   PE: AAOx3 NAD Heart: RRR, ASH 2/6, Lungs: clears, Abd soft Ext: no C/C/E,Neuro; non focal     # Pre-op clearance   METs: <4  RCRI: _0 points class 1 risk of 3.9% for mortality, MI, cardiac arrest in 30 days  WARD: 0.1% risk for mortality, MI, cardiac arrest in 30 days  ECG reviewed: NSR  Echo( outpt) reviewed: Moderate Aortic Stenosis  Patient is low risk for low/moderate risk procedure. No further cardiac work up recommended   Medications to hold perioperatively: Hold HCTZ and Losartan day of procedure, can resume after procedure    Thank you for the consult, Med consult will follow with you.

## 2024-04-11 NOTE — CONSULT NOTE ADULT - ASSESSMENT
58F PMHx HTN, HLD presenting for hematuria found to have 8cm R kidney mass pending cystoscopy      RCRI: _0 points class 1 risk of 3.9% for mortality, MI, cardiac arrest in 30 days  WARD: 0.1% risk for mortality, MI, cardiac arrest in 30 days  METs: <4    ECG reviewed: NSR  Echo reviewed: Moderate Aortic Stenosis    Hx of cardiac disease: no documented disease hx  Hx of pulmonary disease: no documented disease hx  Hx of bleeding/hypercoagulable disorder:  no documented disease hx  Hx of adverse reaction to anesthesia: no documented disease hx    Patient is low risk for low/moderate risk procedure. No further cardiac work up recommended     Medications to hold perioperatively: Hold HCTZ and Losartan day of procedure, can resume after procedure

## 2024-04-11 NOTE — PROGRESS NOTE ADULT - NS ATTEND AMEND GEN_ALL_CORE FT
58 year old female with hematuria. Found to have 8 cm enhancing right renal mass projecting into the renal pelvis with probable blood clots in the renal pelvis and delayed right renal excretion. Questionable 1.7cm lymph node. Will obtain MRU to better characterize, urine cytology, consider IR biopsy of node. WBC improving on ctx, patient denies any CVA tenderness or infectious symptoms. Cr uptrending, will continue to monitor.

## 2024-04-11 NOTE — CONSULT NOTE ADULT - SUBJECTIVE AND OBJECTIVE BOX
Med Consult Note  ==============================================================================================================  HPI: 58y Female  HPI:  Patient is a 58y old  Female who presents with a chief complaint of     ED HPI:   "58F PMHx HTN, HLD, currently being worked up for post-menopausal bleeding since  (Dr. Solis/Yudy). Pelvic US at the time showed thickened endometrium. Pt underwent endometrial biopsy & D&C  without any endometrial tissue. Pt had planned hysteroscopy/D&C later this month. Pt endorses around 230 AM pt woke up w urge to urinate but felt like she couldn't empty bladder & noticed multiple clots in toilet. Pt continued to pass clots multiple times today, unsure if urinating as well. Pt denies HA, lightheadedness, CP, SOB, painful urination."     <<<<<<<  NOTE >>>>>>    Patient is as stated above, patient last saw a Urologist in 2018 and , where she underwent Urodynamics, where she was told she had a small bladder.  Patient also has been suffering from incontinence issues, where she has accidents.  She was taking Ditropan for the incontinence which she said helped, but recently states it has not been working, so she has stopped taking it. She endorses having to wake up every hour to void at night.  She also has been having more urinary incontinence.  Patient denies n/v/f/c, dysuria, incomplete emptying, chest pain, SOB.       Vital Signs Last 24 Hrs  T(C): 37 (2024 19:19), Max: 37.7 (2024 16:06)  T(F): 98.6 (2024 19:19), Max: 99.8 (2024 16:06)  HR: 80 (2024 19:19) (80 - 102)  BP: 108/68 (2024 19:19) (108/68 - 166/96)  BP(mean): --  RR: 18 (2024 19:19) (18 - 20)  SpO2: 96% (2024 19:19) (96% - 96%)    Parameters below as of 2024 19:19  Patient On (Oxygen Delivery Method): room air      I&O's Summary      PE:  Gen: NAD, alert and oriented x 3   Abd: soft nt/nd. Negative CVAT B/L  : Strickland catheter in place draining clear.  DARIANA: Deferred    LABS:                        12.1   15.56 )-----------( 279      ( 2024 17:31 )             39.3     04-09    139  |  102  |  24<H>  ----------------------------<  120<H>  See Note   |  24  |  1.41<H>    Ca    9.3      2024 17:31   (2024 22:27)      PAST MEDICAL & SURGICAL HISTORY:  HTN (hypertension)      Obesity      Abnormal vaginal bleeding      Heart murmur      H/O  section      H/O wrist surgery  right + hardware      History of ectopic pregnancy        Home Meds: Home Medications:  atorvastatin 10 mg oral tablet: 1 tab(s) orally once a day (2024 10:48)  carvedilol 6.25 mg oral tablet: 1 tab(s) orally 2 times a day (2024 10:48)  Hyzaar 100 mg-12.5 mg oral tablet: 1 tab(s) orally once a day (2024 10:48)    Allergies: Allergies    lisinopril (Angioedema)    Intolerances      Soc:   Advanced Directives: Presumed Full Code     CURRENT MEDICATIONS:   --------------------------------------------------------------------------------------  Neurologic Medications  acetaminophen     Tablet .. 650 milliGRAM(s) Oral every 6 hours PRN Temp greater or equal to 38C (100.4F), Mild Pain (1 - 3)    Respiratory Medications    Cardiovascular Medications  carvedilol 6.25 milliGRAM(s) Oral every 12 hours  hydrochlorothiazide 25 milliGRAM(s) Oral daily  losartan 100 milliGRAM(s) Oral daily    Gastrointestinal Medications  sodium chloride 0.9% lock flush 3 milliLiter(s) IV Push every 8 hours    Genitourinary Medications  oxybutynin 5 milliGRAM(s) Oral every 8 hours PRN Bladder spasms    Hematologic/Oncologic Medications  heparin   Injectable 7500 Unit(s) SubCutaneous every 8 hours    Antimicrobial/Immunologic Medications  cefTRIAXone   IVPB 1000 milliGRAM(s) IV Intermittent every 24 hours    Endocrine/Metabolic Medications  atorvastatin 10 milliGRAM(s) Oral at bedtime    Topical/Other Medications    --------------------------------------------------------------------------------------    VITAL SIGNS, INS/OUTS (last 24 hours):  --------------------------------------------------------------------------------------  ICU Vital Signs Last 24 Hrs  T(C): 37.1 (2024 09:20), Max: 37.7 (10 Apr 2024 17:05)  T(F): 98.7 (2024 09:20), Max: 99.9 (10 Apr 2024 17:05)  HR: 71 (2024 09:20) (71 - 80)  BP: 121/72 (2024 09:20) (120/64 - 130/72)  BP(mean): --  ABP: --  ABP(mean): --  RR: 17 (2024 09:20) (17 - 18)  SpO2: 95% (2024 09:20) (95% - 96%)    O2 Parameters below as of 2024 09:20  Patient On (Oxygen Delivery Method): room air          I&O's Summary    10 Apr 2024 07:01  -  2024 07:00  --------------------------------------------------------  IN: 674 mL / OUT: 1350 mL / NET: -676 mL    2024 07:01  -  2024 12:28  --------------------------------------------------------  IN: 320 mL / OUT: 0 mL / NET: 320 mL      --------------------------------------------------------------------------------------    EXAM:  General: NAD  Head: NC/AT; MMM; PERRL; EOMI;  Neck: Supple; no JVD  Respiratory: CTAB; no wheezes/rales/rhonchi  Cardiovascular: Regular rhythm/rate; S1/S2+, no murmurs, rubs gallops   Gastrointestinal: Soft; NTND; bowel sounds normal and present  Extremities: WWP; no edema/cyanosis  Neurological: A&Ox3, CNII-XII grossly intact; no obvious focal deficits  Integument: intact; normal turgor, texture, temperature, color for age    LABS  --------------------------------------------------------------------------------------  Labs:  CAPILLARY BLOOD GLUCOSE                              10.7   12.94 )-----------( 252      ( 2024 07:54 )             35.8             141  |  105  |  23  ----------------------------<  134<H>  3.9   |  25  |  1.60<H>      Calcium: 8.8 mg/dL (24 @ 07:54)      LFTs:     Lactate, Blood: 0.9 mmol/L (24 @ 20:31)      Coags:     12.8   ----< 1.13    ( 10 Apr 2024 12:00 )     29.0                Urinalysis Basic - ( 2024 07:54 )    Color: x / Appearance: x / SG: x / pH: x  Gluc: 134 mg/dL / Ketone: x  / Bili: x / Urobili: x   Blood: x / Protein: x / Nitrite: x   Leuk Esterase: x / RBC: x / WBC x   Sq Epi: x / Non Sq Epi: x / Bacteria: x        Culture - Blood (collected 2024 20:30)  Source: .Blood Blood-Peripheral  Preliminary Report (10 Apr 2024 22:00):    No growth at 1 day.    Culture - Blood (collected 2024 20:20)  Source: .Blood Blood-Peripheral  Preliminary Report (10 Apr 2024 22:00):    No growth at 1 day.    Culture - Urine (collected 2024 18:44)  Source: Clean Catch Clean Catch (Midstream)  Preliminary Report (10 Apr 2024 22:11):    >100,000 CFU/ml Escherichia coli        --------------------------------------------------------------------------------------    OTHER LABS    IMAGING RESULTS  ****************      ASSESSMENT/ PLAN:  58y Female ***      Attending aware and agrees with plan   Med Consult Note  ==============================================================================================================      ED HPI:   "58F PMHx HTN, HLD, currently being worked up for post-menopausal bleeding since  (Dr. Solis/Yudy). Pelvic US at the time showed thickened endometrium. Pt underwent endometrial biopsy & D&C  without any endometrial tissue. Pt had planned hysteroscopy/D&C later this month. Pt endorses around 230 AM pt woke up w urge to urinate but felt like she couldn't empty bladder & noticed multiple clots in toilet. Pt continued to pass clots multiple times today, unsure if urinating as well. Pt denies HA, lightheadedness, CP, SOB, painful urination."     <<<<<<<  NOTE >>>>>>    Patient is as stated above, patient last saw a Urologist in 2018 and , where she underwent Urodynamics, where she was told she had a small bladder.  Patient also has been suffering from incontinence issues, where she has accidents.  She was taking Ditropan for the incontinence which she said helped, but recently states it has not been working, so she has stopped taking it. She endorses having to wake up every hour to void at night.  She also has been having more urinary incontinence.  Patient denies n/v/f/c, dysuria, incomplete emptying, chest pain, SOB.       Vital Signs Last 24 Hrs  T(C): 37 (2024 19:19), Max: 37.7 (2024 16:06)  T(F): 98.6 (2024 19:19), Max: 99.8 (2024 16:06)  HR: 80 (2024 19:19) (80 - 102)  BP: 108/68 (2024 19:19) (108/68 - 166/96)  BP(mean): --  RR: 18 (2024 19:19) (18 - 20)  SpO2: 96% (2024 19:19) (96% - 96%)    Parameters below as of 2024 19:19  Patient On (Oxygen Delivery Method): room air      I&O's Summary      PE:  Gen: NAD, alert and oriented x 3   Abd: soft nt/nd. Negative CVAT B/L  : Strickland catheter in place draining clear.  DARIANA: Deferred    LABS:                        12.1   15.56 )-----------( 279      ( 2024 17:31 )             39.3     04-09    139  |  102  |  24<H>  ----------------------------<  120<H>  See Note   |  24  |  1.41<H>    Ca    9.3      2024 17:31   (2024 22:27)      PAST MEDICAL & SURGICAL HISTORY:  HTN (hypertension)      Obesity      Abnormal vaginal bleeding      Heart murmur      H/O  section      H/O wrist surgery  right + hardware      History of ectopic pregnancy        Home Meds: Home Medications:  atorvastatin 10 mg oral tablet: 1 tab(s) orally once a day (2024 10:48)  carvedilol 6.25 mg oral tablet: 1 tab(s) orally 2 times a day (2024 10:48)  Hyzaar 100 mg-12.5 mg oral tablet: 1 tab(s) orally once a day (2024 10:48)    Allergies: Allergies    lisinopril (Angioedema)    Intolerances      Soc:   Advanced Directives: Presumed Full Code     CURRENT MEDICATIONS:   --------------------------------------------------------------------------------------  Neurologic Medications  acetaminophen     Tablet .. 650 milliGRAM(s) Oral every 6 hours PRN Temp greater or equal to 38C (100.4F), Mild Pain (1 - 3)    Respiratory Medications    Cardiovascular Medications  carvedilol 6.25 milliGRAM(s) Oral every 12 hours  hydrochlorothiazide 25 milliGRAM(s) Oral daily  losartan 100 milliGRAM(s) Oral daily    Gastrointestinal Medications  sodium chloride 0.9% lock flush 3 milliLiter(s) IV Push every 8 hours    Genitourinary Medications  oxybutynin 5 milliGRAM(s) Oral every 8 hours PRN Bladder spasms    Hematologic/Oncologic Medications  heparin   Injectable 7500 Unit(s) SubCutaneous every 8 hours    Antimicrobial/Immunologic Medications  cefTRIAXone   IVPB 1000 milliGRAM(s) IV Intermittent every 24 hours    Endocrine/Metabolic Medications  atorvastatin 10 milliGRAM(s) Oral at bedtime    Topical/Other Medications    --------------------------------------------------------------------------------------    VITAL SIGNS, INS/OUTS (last 24 hours):  --------------------------------------------------------------------------------------  ICU Vital Signs Last 24 Hrs  T(C): 37.1 (2024 09:20), Max: 37.7 (10 Apr 2024 17:05)  T(F): 98.7 (:20), Max: 99.9 (10 Apr 2024 17:05)  HR: 71 (:20) (71 - 80)  BP: 121/72 (:20) (120/64 - 130/72)  BP(mean): --  ABP: --  ABP(mean): --  RR: 17 (:20) (17 - 18)  SpO2: 95% (:20) (95% - 96%)    O2 Parameters below as of 2024 09:20  Patient On (Oxygen Delivery Method): room air          I&O's Summary    10 Apr 2024 07:01  -  2024 07:00  --------------------------------------------------------  IN: 674 mL / OUT: 1350 mL / NET: -676 mL    2024 07:01  -  2024 12:28  --------------------------------------------------------  IN: 320 mL / OUT: 0 mL / NET: 320 mL      --------------------------------------------------------------------------------------    EXAM:  General: NAD  Head: NC/AT; MMM; PERRL; EOMI;  Neck: Supple; no JVD  Respiratory: CTAB; no wheezes/rales/rhonchi  Cardiovascular: Regular rhythm/rate; S1/S2+, no murmurs, rubs gallops   Gastrointestinal: Soft; NTND; bowel sounds normal and present  Extremities: WWP; no edema/cyanosis  Neurological: A&Ox3, CNII-XII grossly intact; no obvious focal deficits  Integument: intact; normal turgor, texture, temperature, color for age    LABS  --------------------------------------------------------------------------------------  Labs:  CAPILLARY BLOOD GLUCOSE                              10.7   12.94 )-----------( 252      ( 2024 07:54 )             35.8             141  |  105  |  23  ----------------------------<  134<H>  3.9   |  25  |  1.60<H>      Calcium: 8.8 mg/dL (24 @ 07:54)      LFTs:     Lactate, Blood: 0.9 mmol/L (24 @ 20:31)      Coags:     12.8   ----< 1.13    ( 10 Apr 2024 12:00 )     29.0                Urinalysis Basic - ( 2024 07:54 )    Color: x / Appearance: x / SG: x / pH: x  Gluc: 134 mg/dL / Ketone: x  / Bili: x / Urobili: x   Blood: x / Protein: x / Nitrite: x   Leuk Esterase: x / RBC: x / WBC x   Sq Epi: x / Non Sq Epi: x / Bacteria: x        Culture - Blood (collected 2024 20:30)  Source: .Blood Blood-Peripheral  Preliminary Report (10 Apr 2024 22:00):    No growth at 1 day.    Culture - Blood (collected 2024 20:20)  Source: .Blood Blood-Peripheral  Preliminary Report (10 Apr 2024 22:00):    No growth at 1 day.    Culture - Urine (collected 2024 18:44)  Source: Clean Catch Clean Catch (Midstream)  Preliminary Report (10 Apr 2024 22:11):    >100,000 CFU/ml Escherichia coli        --------------------------------------------------------------------------------------    OTHER LABS    IMAGING RESULTS  ****************      ASSESSMENT/ PLAN:  58y Female ***      Attending aware and agrees with plan

## 2024-04-12 ENCOUNTER — TRANSCRIPTION ENCOUNTER (OUTPATIENT)
Age: 59
End: 2024-04-12

## 2024-04-12 VITALS
SYSTOLIC BLOOD PRESSURE: 136 MMHG | HEART RATE: 74 BPM | DIASTOLIC BLOOD PRESSURE: 82 MMHG | TEMPERATURE: 98 F | OXYGEN SATURATION: 95 % | RESPIRATION RATE: 18 BRPM

## 2024-04-12 LAB
-  AMOXICILLIN/CLAVULANIC ACID: SIGNIFICANT CHANGE UP
-  AMPICILLIN/SULBACTAM: SIGNIFICANT CHANGE UP
-  AMPICILLIN: SIGNIFICANT CHANGE UP
-  CEFAZOLIN: SIGNIFICANT CHANGE UP
-  CEFEPIME: SIGNIFICANT CHANGE UP
-  CEFOXITIN: SIGNIFICANT CHANGE UP
-  CEFTRIAXONE: SIGNIFICANT CHANGE UP
-  CEFUROXIME: SIGNIFICANT CHANGE UP
-  CIPROFLOXACIN: SIGNIFICANT CHANGE UP
-  GENTAMICIN: SIGNIFICANT CHANGE UP
-  LEVOFLOXACIN: SIGNIFICANT CHANGE UP
-  NITROFURANTOIN: SIGNIFICANT CHANGE UP
-  PIPERACILLIN/TAZOBACTAM: SIGNIFICANT CHANGE UP
-  TOBRAMYCIN: SIGNIFICANT CHANGE UP
-  TRIMETHOPRIM/SULFAMETHOXAZOLE: SIGNIFICANT CHANGE UP
ANION GAP SERPL CALC-SCNC: 12 MMOL/L — SIGNIFICANT CHANGE UP (ref 5–17)
APTT BLD: 27.3 SEC — SIGNIFICANT CHANGE UP (ref 24.5–35.6)
BLD GP AB SCN SERPL QL: NEGATIVE — SIGNIFICANT CHANGE UP
BUN SERPL-MCNC: 26 MG/DL — HIGH (ref 7–23)
CALCIUM SERPL-MCNC: 9 MG/DL — SIGNIFICANT CHANGE UP (ref 8.4–10.5)
CHLORIDE SERPL-SCNC: 102 MMOL/L — SIGNIFICANT CHANGE UP (ref 96–108)
CO2 SERPL-SCNC: 23 MMOL/L — SIGNIFICANT CHANGE UP (ref 22–31)
CREAT SERPL-MCNC: 1.47 MG/DL — HIGH (ref 0.5–1.3)
CULTURE RESULTS: ABNORMAL
EGFR: 41 ML/MIN/1.73M2 — LOW
GLUCOSE SERPL-MCNC: 132 MG/DL — HIGH (ref 70–99)
HCT VFR BLD CALC: 34.6 % — SIGNIFICANT CHANGE UP (ref 34.5–45)
HGB BLD-MCNC: 10.7 G/DL — LOW (ref 11.5–15.5)
INR BLD: 1 — SIGNIFICANT CHANGE UP (ref 0.85–1.18)
MAGNESIUM SERPL-MCNC: 2.3 MG/DL — SIGNIFICANT CHANGE UP (ref 1.6–2.6)
MCHC RBC-ENTMCNC: 27.2 PG — SIGNIFICANT CHANGE UP (ref 27–34)
MCHC RBC-ENTMCNC: 30.9 GM/DL — LOW (ref 32–36)
MCV RBC AUTO: 88 FL — SIGNIFICANT CHANGE UP (ref 80–100)
METHOD TYPE: SIGNIFICANT CHANGE UP
NON-GYNECOLOGICAL CYTOLOGY STUDY: SIGNIFICANT CHANGE UP
NON-GYNECOLOGICAL CYTOLOGY STUDY: SIGNIFICANT CHANGE UP
NRBC # BLD: 0 /100 WBCS — SIGNIFICANT CHANGE UP (ref 0–0)
ORGANISM # SPEC MICROSCOPIC CNT: ABNORMAL
ORGANISM # SPEC MICROSCOPIC CNT: SIGNIFICANT CHANGE UP
PHOSPHATE SERPL-MCNC: 3 MG/DL — SIGNIFICANT CHANGE UP (ref 2.5–4.5)
PLATELET # BLD AUTO: 271 K/UL — SIGNIFICANT CHANGE UP (ref 150–400)
POTASSIUM SERPL-MCNC: 3.7 MMOL/L — SIGNIFICANT CHANGE UP (ref 3.5–5.3)
POTASSIUM SERPL-SCNC: 3.7 MMOL/L — SIGNIFICANT CHANGE UP (ref 3.5–5.3)
PROTHROM AB SERPL-ACNC: 11.4 SEC — SIGNIFICANT CHANGE UP (ref 9.5–13)
RBC # BLD: 3.93 M/UL — SIGNIFICANT CHANGE UP (ref 3.8–5.2)
RBC # FLD: 14.5 % — SIGNIFICANT CHANGE UP (ref 10.3–14.5)
RH IG SCN BLD-IMP: POSITIVE — SIGNIFICANT CHANGE UP
SODIUM SERPL-SCNC: 137 MMOL/L — SIGNIFICANT CHANGE UP (ref 135–145)
SPECIMEN SOURCE: SIGNIFICANT CHANGE UP
WBC # BLD: 13.46 K/UL — HIGH (ref 3.8–10.5)
WBC # FLD AUTO: 13.46 K/UL — HIGH (ref 3.8–10.5)

## 2024-04-12 PROCEDURE — 99232 SBSQ HOSP IP/OBS MODERATE 35: CPT | Mod: GC

## 2024-04-12 PROCEDURE — 86803 HEPATITIS C AB TEST: CPT

## 2024-04-12 PROCEDURE — 74178 CT ABD&PLV WO CNTR FLWD CNTR: CPT | Mod: MC

## 2024-04-12 PROCEDURE — 83605 ASSAY OF LACTIC ACID: CPT

## 2024-04-12 PROCEDURE — 84133 ASSAY OF URINE POTASSIUM: CPT

## 2024-04-12 PROCEDURE — 84132 ASSAY OF SERUM POTASSIUM: CPT

## 2024-04-12 PROCEDURE — 81001 URINALYSIS AUTO W/SCOPE: CPT

## 2024-04-12 PROCEDURE — 99285 EMERGENCY DEPT VISIT HI MDM: CPT | Mod: 25

## 2024-04-12 PROCEDURE — 86900 BLOOD TYPING SEROLOGIC ABO: CPT

## 2024-04-12 PROCEDURE — 71250 CT THORAX DX C-: CPT | Mod: MC

## 2024-04-12 PROCEDURE — 85730 THROMBOPLASTIN TIME PARTIAL: CPT

## 2024-04-12 PROCEDURE — 87040 BLOOD CULTURE FOR BACTERIA: CPT

## 2024-04-12 PROCEDURE — 82570 ASSAY OF URINE CREATININE: CPT

## 2024-04-12 PROCEDURE — 88112 CYTOPATH CELL ENHANCE TECH: CPT

## 2024-04-12 PROCEDURE — 80048 BASIC METABOLIC PNL TOTAL CA: CPT

## 2024-04-12 PROCEDURE — 84295 ASSAY OF SERUM SODIUM: CPT

## 2024-04-12 PROCEDURE — 83935 ASSAY OF URINE OSMOLALITY: CPT

## 2024-04-12 PROCEDURE — 84156 ASSAY OF PROTEIN URINE: CPT

## 2024-04-12 PROCEDURE — 86901 BLOOD TYPING SEROLOGIC RH(D): CPT

## 2024-04-12 PROCEDURE — 74176 CT ABD & PELVIS W/O CONTRAST: CPT | Mod: MC

## 2024-04-12 PROCEDURE — 84300 ASSAY OF URINE SODIUM: CPT

## 2024-04-12 PROCEDURE — 84540 ASSAY OF URINE/UREA-N: CPT

## 2024-04-12 PROCEDURE — 96374 THER/PROPH/DIAG INJ IV PUSH: CPT

## 2024-04-12 PROCEDURE — 87186 SC STD MICRODIL/AGAR DIL: CPT

## 2024-04-12 PROCEDURE — 86850 RBC ANTIBODY SCREEN: CPT

## 2024-04-12 PROCEDURE — 82803 BLOOD GASES ANY COMBINATION: CPT

## 2024-04-12 PROCEDURE — 87086 URINE CULTURE/COLONY COUNT: CPT

## 2024-04-12 PROCEDURE — 85610 PROTHROMBIN TIME: CPT

## 2024-04-12 PROCEDURE — 85027 COMPLETE CBC AUTOMATED: CPT

## 2024-04-12 PROCEDURE — 82330 ASSAY OF CALCIUM: CPT

## 2024-04-12 PROCEDURE — 83735 ASSAY OF MAGNESIUM: CPT

## 2024-04-12 PROCEDURE — 36415 COLL VENOUS BLD VENIPUNCTURE: CPT

## 2024-04-12 PROCEDURE — 84100 ASSAY OF PHOSPHORUS: CPT

## 2024-04-12 RX ORDER — CEFTRIAXONE 500 MG/1
1000 INJECTION, POWDER, FOR SOLUTION INTRAMUSCULAR; INTRAVENOUS EVERY 24 HOURS
Refills: 0 | Status: DISCONTINUED | OUTPATIENT
Start: 2024-04-12 | End: 2024-04-12

## 2024-04-12 RX ORDER — CEFPODOXIME PROXETIL 100 MG
1 TABLET ORAL
Qty: 14 | Refills: 0
Start: 2024-04-12 | End: 2024-04-18

## 2024-04-12 RX ADMIN — Medication 5 MILLIGRAM(S): at 08:44

## 2024-04-12 RX ADMIN — CEFTRIAXONE 100 MILLIGRAM(S): 500 INJECTION, POWDER, FOR SOLUTION INTRAMUSCULAR; INTRAVENOUS at 18:04

## 2024-04-12 RX ADMIN — CARVEDILOL PHOSPHATE 6.25 MILLIGRAM(S): 80 CAPSULE, EXTENDED RELEASE ORAL at 17:48

## 2024-04-12 RX ADMIN — SODIUM CHLORIDE 3 MILLILITER(S): 9 INJECTION INTRAMUSCULAR; INTRAVENOUS; SUBCUTANEOUS at 15:41

## 2024-04-12 NOTE — DIETITIAN INITIAL EVALUATION ADULT - NSFNSNUTRHOMESUPPLEMENTFT_GEN_A_CORE
States that at home pt takes multivitamin, B12, probiotic. Reports no additional use of oral nutritional supplement.

## 2024-04-12 NOTE — PROGRESS NOTE ADULT - SUBJECTIVE AND OBJECTIVE BOX
AM Note    No acute events overnight.      Vital Signs Last 24 Hrs  T(C): 37.5 (04-11-24 @ 23:33), Max: 37.5 (04-11-24 @ 23:33)  T(F): 99.5 (04-11-24 @ 23:33), Max: 99.5 (04-11-24 @ 23:33)  HR: 85 (04-11-24 @ 23:33) (67 - 85)  BP: 121/68 (04-11-24 @ 23:33) (108/71 - 121/72)  BP(mean): --  RR: 17 (04-11-24 @ 23:33) (17 - 18)  SpO2: 95% (04-11-24 @ 23:33) (95% - 97%)     11 Apr 2024 07:54    141    |  105    |  23     ----------------------------<  134    3.9     |  25     |  1.60     Ca    8.8        11 Apr 2024 07:54  Phos  3.4       11 Apr 2024 07:54  Mg     2.3       11 Apr 2024 07:54                            10.7   12.94 )-----------( 252      ( 11 Apr 2024 07:54 )             35.8         I&O's Summary    10 Apr 2024 07:01  -  11 Apr 2024 07:00  --------------------------------------------------------  IN: 674 mL / OUT: 1350 mL / NET: -676 mL    11 Apr 2024 07:01  -  12 Apr 2024 05:30  --------------------------------------------------------  IN: 680 mL / OUT: 1351 mL / NET: -671 mL              PHYSICAL EXAM:  General: No acute distress.  Alert and Oriented x 3  Abdominal Exam: soft nt/nd.   Exam: Strickland catheter in place draining yellow/clear output. 
INTERVAL HPI/OVERNIGHT EVENTS:  No acute events overnight.    VITALS:    T(F): 98.5 (04-10-24 @ 20:56), Max: 99.9 (04-10-24 @ 17:05)  HR: 74 (04-10-24 @ 20:56) (74 - 97)  BP: 130/72 (04-10-24 @ 20:56) (100/66 - 130/72)  RR: 18 (04-10-24 @ 20:56) (17 - 18)  SpO2: 96% (04-10-24 @ 20:56) (94% - 96%)  Wt(kg): --    I&O's Detail    09 Apr 2024 07:01  -  10 Apr 2024 07:00  --------------------------------------------------------  IN:    sodium chloride 0.9%: 600 mL  Total IN: 600 mL    OUT:    Indwelling Catheter - Urethral (mL): 550 mL  Total OUT: 550 mL    Total NET: 50 mL      10 Apr 2024 07:01  -  11 Apr 2024 04:59  --------------------------------------------------------  IN:    Oral Fluid: 674 mL  Total IN: 674 mL    OUT:    Indwelling Catheter - Urethral (mL): 1350 mL  Total OUT: 1350 mL    Total NET: -676 mL          MEDICATIONS:    ANTIBIOTICS:  cefTRIAXone   IVPB 1000 milliGRAM(s) IV Intermittent every 24 hours      PAIN CONTROL:  acetaminophen     Tablet .. 650 milliGRAM(s) Oral every 6 hours PRN       MEDS:  oxybutynin 5 milliGRAM(s) Oral every 8 hours PRN      HEME/ONC  heparin   Injectable 7500 Unit(s) SubCutaneous every 8 hours        PHYSICAL EXAM:  General: No acute distress.  Alert and Oriented x 3  Abdominal Exam: soft nt/nd.   Exam: Strickland catheter in place draining yellow/clear output.       LABS:                        10.7   16.34 )-----------( 261      ( 10 Apr 2024 05:30 )             34.9     04-10    139  |  104  |  23  ----------------------------<  127<H>  3.9   |  25  |  1.52<H>    Ca    8.9      10 Apr 2024 05:30      PT/INR - ( 10 Apr 2024 12:00 )   PT: 12.8 sec;   INR: 1.13          PTT - ( 10 Apr 2024 12:00 )  PTT:29.0 sec  Urinalysis Basic - ( 10 Apr 2024 08:26 )    Color: Orange / Appearance: Clear / SG: >1.030 / pH: x  Gluc: x / Ketone: Negative mg/dL  / Bili: Negative / Urobili: 0.2 mg/dL   Blood: x / Protein: 30 mg/dL / Nitrite: Negative   Leuk Esterase: Small / RBC: 422 /HPF / WBC 39 /HPF   Sq Epi: x / Non Sq Epi: 2 /HPF / Bacteria: Negative /HPF        RADIOLOGY & ADDITIONAL TESTS:      
GYN Progress Note    Patient seen at bedside.  Some suprapubic discomfort, otherwise no other complaints.  Tolerating reg diet.  OOB ambulating.  ayon in place, now draining yellow urine after urology flushed ayon overnight.  No vaginal bleeding.  +flatus    Denies CP, palpitations, SOB, fever, chills, nausea, vomiting.    Vital Signs Last 24 Hrs  T(C): 37.1 (10 Apr 2024 06:03), Max: 37.7 (09 Apr 2024 16:06)  T(F): 98.7 (10 Apr 2024 06:03), Max: 99.8 (09 Apr 2024 16:06)  HR: 79 (10 Apr 2024 06:03) (76 - 102)  BP: 100/66 (10 Apr 2024 06:03) (100/66 - 166/96)  BP(mean): --  RR: 17 (10 Apr 2024 06:03) (17 - 20)  SpO2: 96% (10 Apr 2024 06:03) (96% - 98%)    Parameters below as of 10 Apr 2024 06:03  Patient On (Oxygen Delivery Method): room air        Physical Exam:  Gen: No Acute Distress  GI: soft, Minimal discomfort at suprapubic and LLQ, nondistended, +BS, no rebound, no guarding.   : ayon w/ clear yellow urine.  Ext: SCDs in place, wnl    I&O's Summary    09 Apr 2024 07:01  -  10 Apr 2024 06:57  --------------------------------------------------------  IN: 600 mL / OUT: 550 mL / NET: 50 mL      MEDICATIONS  (STANDING):  atorvastatin 10 milliGRAM(s) Oral at bedtime  carvedilol 6.25 milliGRAM(s) Oral every 12 hours  hydrochlorothiazide 25 milliGRAM(s) Oral daily  losartan 100 milliGRAM(s) Oral daily  sodium chloride 0.9%. 1000 milliLiter(s) (100 mL/Hr) IV Continuous <Continuous>    MEDICATIONS  (PRN):      LABS:                        12.1   15.56 )-----------( 279      ( 09 Apr 2024 17:31 )             39.3     04-09    139  |  102  |  24<H>  ----------------------------<  120<H>  See Note   |  24  |  1.41<H>    Ca    9.3      09 Apr 2024 17:31        Urinalysis Basic - ( 09 Apr 2024 17:31 )    Color: x / Appearance: x / SG: x / pH: x  Gluc: 120 mg/dL / Ketone: x  / Bili: x / Urobili: x   Blood: x / Protein: x / Nitrite: x   Leuk Esterase: x / RBC: x / WBC x   Sq Epi: x / Non Sq Epi: x / Bacteria: x            
O/N Events:    Subjective/ROS: Patient seen and examined at bedside.     Denies Fever/Chills, HA, CP, SOB, n/v, changes in bowel/urinary habits.  12pt ROS otherwise negative.    VITALS  Vital Signs Last 24 Hrs  T(C): 36.6 (12 Apr 2024 09:00), Max: 37.5 (11 Apr 2024 23:33)  T(F): 97.8 (12 Apr 2024 09:00), Max: 99.5 (11 Apr 2024 23:33)  HR: 94 (12 Apr 2024 09:00) (67 - 99)  BP: 107/75 (12 Apr 2024 09:00) (107/75 - 143/79)  BP(mean): --  RR: 17 (12 Apr 2024 09:00) (17 - 18)  SpO2: 94% (12 Apr 2024 09:00) (94% - 97%)    Parameters below as of 12 Apr 2024 09:00  Patient On (Oxygen Delivery Method): room air        CAPILLARY BLOOD GLUCOSE          PHYSICAL EXAM  General: NAD  Neurological: A&Ox3, CNII-XII grossly intact; no obvious focal deficits    MEDICATIONS  (STANDING):  atorvastatin 10 milliGRAM(s) Oral at bedtime  carvedilol 6.25 milliGRAM(s) Oral every 12 hours  cefTRIAXone   IVPB 1000 milliGRAM(s) IV Intermittent every 24 hours  hydrochlorothiazide 25 milliGRAM(s) Oral daily  losartan 100 milliGRAM(s) Oral daily  sodium chloride 0.9% lock flush 3 milliLiter(s) IV Push every 8 hours  sodium chloride 0.9%. 1000 milliLiter(s) (180 mL/Hr) IV Continuous <Continuous>    MEDICATIONS  (PRN):  acetaminophen     Tablet .. 650 milliGRAM(s) Oral every 6 hours PRN Temp greater or equal to 38C (100.4F), Mild Pain (1 - 3)  oxybutynin 5 milliGRAM(s) Oral every 8 hours PRN Bladder spasms      lisinopril (Angioedema)      LABS                        10.7   13.46 )-----------( 271      ( 12 Apr 2024 05:30 )             34.6     04-12    137  |  102  |  26<H>  ----------------------------<  132<H>  3.7   |  23  |  1.47<H>    Ca    9.0      12 Apr 2024 05:30  Phos  3.0     04-12  Mg     2.3     04-12      PT/INR - ( 12 Apr 2024 05:30 )   PT: 11.4 sec;   INR: 1.00          PTT - ( 12 Apr 2024 05:30 )  PTT:27.3 sec  Urinalysis Basic - ( 12 Apr 2024 05:30 )    Color: x / Appearance: x / SG: x / pH: x  Gluc: 132 mg/dL / Ketone: x  / Bili: x / Urobili: x   Blood: x / Protein: x / Nitrite: x   Leuk Esterase: x / RBC: x / WBC x   Sq Epi: x / Non Sq Epi: x / Bacteria: x              IMAGING/EKG/ETC

## 2024-04-12 NOTE — DIETITIAN INITIAL EVALUATION ADULT - PERTINENT LABORATORY DATA
04-12    137  |  102  |  26<H>  ----------------------------<  132<H>  3.7   |  23  |  1.47<H>    Ca    9.0      12 Apr 2024 05:30  Phos  3.0     04-12  Mg     2.3     04-12

## 2024-04-12 NOTE — DISCHARGE NOTE PROVIDER - DETAILS OF MALNUTRITION DIAGNOSIS/DIAGNOSES
This patient has been assessed with a concern for Malnutrition and was treated during this hospitalization for the following Nutrition diagnosis/diagnoses:     -  04/12/2024: Morbid obesity (BMI > 40)

## 2024-04-12 NOTE — DISCHARGE NOTE PROVIDER - NSDCCPCAREPLAN_GEN_ALL_CORE_FT
PRINCIPAL DISCHARGE DIAGNOSIS  Diagnosis: Hematuria  Assessment and Plan of Treatment: improved. continue to monitor urine color and hydrate well      SECONDARY DISCHARGE DIAGNOSES  Diagnosis: E. coli UTI  Assessment and Plan of Treatment: on ceftriaxone in house. continue cefpodoxime outpatient    Diagnosis: Hypertension  Assessment and Plan of Treatment: continue home medications. follow up with PCP    Diagnosis: Hyperlipidemia  Assessment and Plan of Treatment: continue home medications

## 2024-04-12 NOTE — DIETITIAN INITIAL EVALUATION ADULT - NSICDXPASTSURGICALHX_GEN_ALL_CORE_FT
Plan of Care faxed to [facility]. Fax number [phone]. Confirmation number [id number].
PAST SURGICAL HISTORY:  H/O  section     H/O wrist surgery right + hardware    History of ectopic pregnancy

## 2024-04-12 NOTE — DIETITIAN NUTRITION RISK NOTIFICATION - TREATMENT: THE FOLLOWING DIET HAS BEEN RECOMMENDED
Diet, NPO after Midnight:      NPO Start Date: 11-Apr-2024,   NPO Start Time: 23:59  Except Medications (04-11-24 @ 08:37) [Active]  Diet, Regular (04-11-24 @ 08:36) [Active]

## 2024-04-12 NOTE — DISCHARGE NOTE PROVIDER - NSDCMRMEDTOKEN_GEN_ALL_CORE_FT
atorvastatin 10 mg oral tablet: 1 tab(s) orally once a day  carvedilol 6.25 mg oral tablet: 1 tab(s) orally 2 times a day  cefpodoxime 100 mg oral tablet: 1 tab(s) orally every 12 hours MDD: 2 tabs  Hyzaar 100 mg-12.5 mg oral tablet: 1 tab(s) orally once a day

## 2024-04-12 NOTE — DIETITIAN INITIAL EVALUATION ADULT - PHYSCIAL ASSESSMENT
Weights:   4/9 414 pounds (dosing)   UBW: 414 pounds (per pt) endorses stable weight x 3 months.  IBW:154 pounds   %IBW: 269%     Weights noted. RD to continue to monitor weight trends as available.

## 2024-04-12 NOTE — DIETITIAN INITIAL EVALUATION ADULT - NS FNS DIET ORDER
Diet, NPO after Midnight:      NPO Start Date: 11-Apr-2024,   NPO Start Time: 23:59  Except Medications (04-11-24 @ 08:37) [Active]

## 2024-04-12 NOTE — DISCHARGE NOTE PROVIDER - HOSPITAL COURSE
59yo female with PMH of HTN, HLD, PMB, presenting with difficulty urinating and hematuria. CT showing 7.3x7.3cm right renal pole mass. Urine culture growing E. Coli and patient  has been on ceftriaxone. Patient's VSS and she is hemodynamically stable. Adilia's ayon was removed on 4/12 and she was able to void independently. Patient is scheduled for follow up this week with Dr. Enamorado.

## 2024-04-12 NOTE — DIETITIAN INITIAL EVALUATION ADULT - REASON FOR ADMISSION
HEMATURIA  "58F PMHx HTN, HLD presenting for hematuria found to have 8cm R kidney mass pending cystoscopy"

## 2024-04-12 NOTE — DISCHARGE NOTE PROVIDER - NSDCFUSCHEDAPPT_GEN_ALL_CORE_FT
Jerrell Enamorado  [facility]  UROLOGY [address]  Scheduled Appointment: 04/17/2024    Shyla Alves  [facility] PreAdmits  Scheduled Appointment: 04/18/2024    Shyla Alves  [facility]  GYNONC [place] [address]   Scheduled Appointment: 04/18/2024    Shyla Alves  [facility]  GYNONC [address]  Scheduled Appointment: 05/17/2024

## 2024-04-12 NOTE — DISCHARGE NOTE PROVIDER - CARE PROVIDER_API CALL
Jerrell Enamorado.  Urology  [address]  [place] [address]  Phone: [phone]  Fax: [phone]  Scheduled Appointment: 04/17/2024

## 2024-04-12 NOTE — DIETITIAN INITIAL EVALUATION ADULT - ORAL INTAKE PTA/DIET HISTORY
Visited pt at bedside on 9UR. States that at home pt typically consumes 3 meals per day at baseline with no changes. States that she follows a low sodium diet at home. No cultural, ethnic, Quaker food preferences noted. Confirms No known food allergies.

## 2024-04-12 NOTE — PROGRESS NOTE ADULT - ASSESSMENT
57 yo w/ PMH HTN complaints of 1x day of heavy vaginal bleeding, symptoms of incomplete voiding, suprapubic pain. Unclear if bleeding is urinary or vaginal. Evaluation in ED showed coni hematuria w/ ayon catheter, no evidence of vaginal bleeding on pelvic exam. CT showing R renal mass.    Neuro: AAOCx3   Pulm: RA   CV: HTN on losartan, carvedilol, HCTZ, atorvastatin   GI: Reg Diet, NS 100cc/hr  GYN: PMB?   : hematuria, kidney mass, pre-renal SHAHNAZ     [ ] urology primary, care per primary team  [ ] recommend pad counts to monitor for any vaginal bleeding  [ ] f/u AM labs  [ ] f/u CT urogram final read  [ ] f/u Bcx, UCx, urine cytology taken 4/9
58F PMHx HTN, HLD presenting for hematuria found to have 8cm R kidney mass pending cystoscopy      RCRI: _0 points class 1 risk of 3.9% for mortality, MI, cardiac arrest in 30 days  WARD: 0.1% risk for mortality, MI, cardiac arrest in 30 days  METs: <4    ECG reviewed: NSR  Echo reviewed: Moderate Aortic Stenosis    Hx of cardiac disease: no documented disease hx  Hx of pulmonary disease: no documented disease hx  Hx of bleeding/hypercoagulable disorder:  no documented disease hx  Hx of adverse reaction to anesthesia: no documented disease hx    Patient is low risk for low/moderate risk procedure. No further cardiac work up recommended     Medications to hold perioperatively: Hold HCTZ and Losartan day of procedure, can resume after procedure
ASSESSMENT: 58yFemale w/ hematuria, now improved s/p ayon catheter placement and manual irrigation.  Patient VSS, HDS, and afebrile.      PLAN:  Diet: clears  Pain control  Monitor Urine Output  DVT ppx: HSQ, SCD  Cont Abx: Ceftriaxone  OOB/IS
ASSESSMENT: 58yFemale w/ hematuria, now improved s/p ayon catheter placement and manual irrigation.  Patient VSS, HDS, and afebrile.      PLAN:  Diet: clears  Pain control  Monitor Urine Output  DVT ppx: HSQ, SCD  Cont Abx: Ceftriaxone  OOB/IS

## 2024-04-12 NOTE — DISCHARGE NOTE PROVIDER - NSDCFUADDINST_GEN_ALL_CORE_FT
if you feel like you cannot urinate please report to nearest ED, you may need a ayon.     General Discharge Instructions:  Use Tylenol for pain control as needed  Please resume all regular home medications unless specifically advised not to take a particular medication. Also, please take any new medications as prescribed.  Please get plenty of rest, continue to ambulate several times per day, and drink adequate amounts of fluids.     Warning Signs:  Please call your doctor if you experience the following:  *You experience new chest pain, pressure, squeezing or tightness.  *New or worsening cough, shortness of breath, or wheeze.  *If you are vomiting and cannot keep down fluids or your medications.  *You are getting dehydrated due to continued vomiting, diarrhea, or other reasons. Signs of dehydration include dry mouth, rapid heartbeat, or feeling dizzy or faint when standing.  *You see blood or dark/black material when you vomit or have a bowel movement.  *You experience burning when you urinate, have blood in your urine, or experience a discharge.  *Your pain is not improving within 8-12 hours or is not gone within 24 hours. Call or return immediately if your pain is getting worse, changes location, or moves to your chest or back.  *You have shaking chills, or fever greater than 100.4 degrees Fahrenheit.  *Any change in your symptoms, or any new symptoms that concern you

## 2024-04-12 NOTE — DIETITIAN INITIAL EVALUATION ADULT - NSFNSGIIOFT_GEN_A_CORE
04-11-24 @ 07:01  -  04-12-24 @ 07:00  --------------------------------------------------------  OUT:    Stool (mL): 2 mL  Total OUT: 2 mL    Total NET: -2 mL

## 2024-04-12 NOTE — DIETITIAN NUTRITION RISK NOTIFICATION - ADDITIONAL COMMENTS/DIETITIAN RECOMMENDATIONS
NPO  **as medically feasible, advance diet as tolerated to low sodium, provide nourishments and/or oral nutrition supplements per pt preference  **IF pt's diet is not advanced in the next 24-48h, recommend that team consider alternate means of nutrition** please consult dietitians for recommendations**  2. Monitor weight trends, labs, and skin integrity and bowel movement regularity.    3. RDN will continue to monitor, reassess, and intervene as appropriate.

## 2024-04-12 NOTE — DIETITIAN INITIAL EVALUATION ADULT - EDUCATION DIETARY MODIFICATIONS
Pt states that she was lethargic and tired. Education differed as this time as pt wanted to rest. RD to follow up with education as feasible./not applicable

## 2024-04-12 NOTE — DIETITIAN INITIAL EVALUATION ADULT - OTHER CALCULATIONS
Based on Standards of Care pt exceeds % IBW (269%) thus upper ideal body weight (76.6kg) used for all calculations.

## 2024-04-12 NOTE — DISCHARGE NOTE NURSING/CASE MANAGEMENT/SOCIAL WORK - PATIENT PORTAL LINK FT
You can access the FollowMyHealth Patient Portal offered by [facility] by registering at the following website: [email or web address]. By joining [facility]’s FollowMyHealth portal, you will also be able to view your health information using other applications (apps) compatible with our system.

## 2024-04-12 NOTE — DIETITIAN INITIAL EVALUATION ADULT - NSFNSGIASSESSMENTFT_GEN_A_CORE
No issues with nausea, vomiting, constipation reported at time of visit. States that pt was experiencing diarrhea yesterday. States that since then has been resolved. Last BM noted on 4/12 per pt. Pt is not currently on a bowel regimen.

## 2024-04-12 NOTE — DIETITIAN INITIAL EVALUATION ADULT - REASON
Nutrition focused physical exam deferred at this time as pt reports stable weight. Observed with no overt signs and symptoms of muscle or fat wasting. Based on ASPEN guidelines, pt does not meet criteria for malnutrition.

## 2024-04-12 NOTE — DIETITIAN INITIAL EVALUATION ADULT - PERTINENT MEDS FT
MEDICATIONS  (STANDING):  atorvastatin 10 milliGRAM(s) Oral at bedtime  carvedilol 6.25 milliGRAM(s) Oral every 12 hours  cefTRIAXone   IVPB 1000 milliGRAM(s) IV Intermittent every 24 hours  hydrochlorothiazide 25 milliGRAM(s) Oral daily  losartan 100 milliGRAM(s) Oral daily  sodium chloride 0.9% lock flush 3 milliLiter(s) IV Push every 8 hours  sodium chloride 0.9%. 1000 milliLiter(s) (180 mL/Hr) IV Continuous <Continuous>    MEDICATIONS  (PRN):  acetaminophen     Tablet .. 650 milliGRAM(s) Oral every 6 hours PRN Temp greater or equal to 38C (100.4F), Mild Pain (1 - 3)  oxybutynin 5 milliGRAM(s) Oral every 8 hours PRN Bladder spasms

## 2024-04-14 LAB
CULTURE RESULTS: SIGNIFICANT CHANGE UP
CULTURE RESULTS: SIGNIFICANT CHANGE UP
SPECIMEN SOURCE: SIGNIFICANT CHANGE UP
SPECIMEN SOURCE: SIGNIFICANT CHANGE UP

## 2024-04-17 ENCOUNTER — APPOINTMENT (OUTPATIENT)
Dept: UROLOGY | Facility: CLINIC | Age: 59
End: 2024-04-17
Payer: COMMERCIAL

## 2024-04-17 VITALS
HEIGHT: 69 IN | WEIGHT: 293 LBS | DIASTOLIC BLOOD PRESSURE: 94 MMHG | OXYGEN SATURATION: 97 % | SYSTOLIC BLOOD PRESSURE: 152 MMHG | BODY MASS INDEX: 43.4 KG/M2 | HEART RATE: 68 BPM

## 2024-04-17 DIAGNOSIS — Z00.00 ENCOUNTER FOR GENERAL ADULT MEDICAL EXAMINATION W/OUT ABNORMAL FINDINGS: ICD-10-CM

## 2024-04-17 PROCEDURE — 99205 OFFICE O/P NEW HI 60 MIN: CPT

## 2024-04-17 PROCEDURE — 99215 OFFICE O/P EST HI 40 MIN: CPT

## 2024-04-18 ENCOUNTER — NON-APPOINTMENT (OUTPATIENT)
Age: 59
End: 2024-04-18

## 2024-04-18 ENCOUNTER — APPOINTMENT (OUTPATIENT)
Dept: GYNECOLOGIC ONCOLOGY | Facility: HOSPITAL | Age: 59
End: 2024-04-18

## 2024-04-18 LAB
ALBUMIN SERPL ELPH-MCNC: 3.6 G/DL
ALP BLD-CCNC: 102 U/L
ALT SERPL-CCNC: 14 U/L
ANION GAP SERPL CALC-SCNC: 13 MMOL/L
ANION GAP SERPL CALC-SCNC: 14 MMOL/L
APTT BLD: 29.3 SEC
AST SERPL-CCNC: 15 U/L
BILIRUB SERPL-MCNC: <0.2 MG/DL
BUN SERPL-MCNC: 19 MG/DL
BUN SERPL-MCNC: 19 MG/DL
CALCIUM SERPL-MCNC: 8.8 MG/DL
CALCIUM SERPL-MCNC: 8.8 MG/DL
CHLORIDE SERPL-SCNC: 104 MMOL/L
CHLORIDE SERPL-SCNC: 106 MMOL/L
CO2 SERPL-SCNC: 22 MMOL/L
CO2 SERPL-SCNC: 23 MMOL/L
CREAT SERPL-MCNC: 1.47 MG/DL
CREAT SERPL-MCNC: 1.5 MG/DL
EGFR: 40 ML/MIN/1.73M2
EGFR: 41 ML/MIN/1.73M2
GLUCOSE SERPL-MCNC: 104 MG/DL
GLUCOSE SERPL-MCNC: 107 MG/DL
HCT VFR BLD CALC: 32 %
HGB BLD-MCNC: 9.7 G/DL
INR PPP: 0.94 RATIO
MCHC RBC-ENTMCNC: 26.9 PG
MCHC RBC-ENTMCNC: 30.3 GM/DL
MCV RBC AUTO: 88.9 FL
PLATELET # BLD AUTO: 369 K/UL
POTASSIUM SERPL-SCNC: 4.3 MMOL/L
POTASSIUM SERPL-SCNC: 4.3 MMOL/L
PROT SERPL-MCNC: 7 G/DL
PT BLD: 10.7 SEC
RBC # BLD: 3.6 M/UL
RBC # FLD: 14.6 %
SODIUM SERPL-SCNC: 140 MMOL/L
SODIUM SERPL-SCNC: 142 MMOL/L
WBC # FLD AUTO: 11.74 K/UL

## 2024-04-19 ENCOUNTER — TRANSCRIPTION ENCOUNTER (OUTPATIENT)
Age: 59
End: 2024-04-19

## 2024-04-19 DIAGNOSIS — N28.89 OTHER SPECIFIED DISORDERS OF KIDNEY AND URETER: ICD-10-CM

## 2024-04-19 DIAGNOSIS — N95.0 POSTMENOPAUSAL BLEEDING: ICD-10-CM

## 2024-04-19 DIAGNOSIS — N17.9 ACUTE KIDNEY FAILURE, UNSPECIFIED: ICD-10-CM

## 2024-04-19 DIAGNOSIS — E78.5 HYPERLIPIDEMIA, UNSPECIFIED: ICD-10-CM

## 2024-04-19 DIAGNOSIS — E66.01 MORBID (SEVERE) OBESITY DUE TO EXCESS CALORIES: ICD-10-CM

## 2024-04-19 DIAGNOSIS — B96.20 UNSPECIFIED ESCHERICHIA COLI [E. COLI] AS THE CAUSE OF DISEASES CLASSIFIED ELSEWHERE: ICD-10-CM

## 2024-04-19 DIAGNOSIS — R01.1 CARDIAC MURMUR, UNSPECIFIED: ICD-10-CM

## 2024-04-19 DIAGNOSIS — I35.0 NONRHEUMATIC AORTIC (VALVE) STENOSIS: ICD-10-CM

## 2024-04-19 DIAGNOSIS — I10 ESSENTIAL (PRIMARY) HYPERTENSION: ICD-10-CM

## 2024-04-19 DIAGNOSIS — R31.9 HEMATURIA, UNSPECIFIED: ICD-10-CM

## 2024-04-19 DIAGNOSIS — N39.0 URINARY TRACT INFECTION, SITE NOT SPECIFIED: ICD-10-CM

## 2024-04-19 LAB
APPEARANCE: ABNORMAL
BACTERIA: ABNORMAL /HPF
BILIRUBIN URINE: ABNORMAL
BLOOD URINE: ABNORMAL
COLOR: ABNORMAL
GLUCOSE QUALITATIVE U: NEGATIVE MG/DL
HYALINE CASTS: NORMAL
KETONES URINE: NEGATIVE MG/DL
LEUKOCYTE ESTERASE URINE: ABNORMAL
MICROSCOPIC-UA: NORMAL
NITRITE URINE: POSITIVE
PH URINE: 5.5
PROTEIN URINE: 100 MG/DL
RED BLOOD CELLS URINE: ABNORMAL /HPF
SPECIFIC GRAVITY URINE: 1.02
SQUAMOUS EPITHELIAL CELLS: NORMAL
UROBILINOGEN URINE: 0.2 MG/DL
WHITE BLOOD CELLS URINE: 10 /HPF

## 2024-04-19 NOTE — ASSESSMENT
[FreeTextEntry1] : Assessment: 58-year-old female with gross hematuria and large right-sided renal mass of unclear etiology.  Also with mild SHAHNAZ.  Plan: -Obtain CT renal mass protocol -Schedule for diagnostic right ureteroscopy with possible biopsy, right retrograde pyelogram and selective cytology, right ureteral stent insertion

## 2024-04-19 NOTE — HISTORY OF PRESENT ILLNESS
[FreeTextEntry1] : Name NII GARCIA MRN [id number]  May 29 1965 ------------------------------------------------------------------------------------------------------------------------------------------- Date of First Visit: 24 Referring Provider/PCP: Dr. Coby Munoz / Dr. Reyes Perry ------------------------------------------------------------------------------------------------------------------------------------------- CC: Gross hematuria, renal mass   History of Present Illness: NII GARCIA is a 58-year-old female PMH HTN, morbid obesity, recently admitted to [facility] with gross hematuria, found to have an 8 cm enhancing right renal mass projecting into the renal pelvis with probable blood clots in the renal pelvis and delayed right renal excretion. Questionable 1.7cm lymph node.  Also with recent GYN history notable for 1 day of heavy vaginal bleeding, symptoms of incomplete voiding, suprapubic pain. Cr 1.5 (1.22 in 3/29/24) Of note, personal review of her CT scan from the hospital demonstrates that the large renal mass described, however there is no nephrographic phase to accurately characterize the renal mass in question.

## 2024-04-23 LAB — BACTERIA UR CULT: ABNORMAL

## 2024-04-23 RX ORDER — AMOXICILLIN AND CLAVULANATE POTASSIUM 875; 125 MG/1; MG/1
875-125 TABLET, COATED ORAL
Qty: 14 | Refills: 0 | Status: ACTIVE | COMMUNITY
Start: 2024-04-23 | End: 1900-01-01

## 2024-04-25 ENCOUNTER — APPOINTMENT (OUTPATIENT)
Dept: CT IMAGING | Facility: HOSPITAL | Age: 59
End: 2024-04-25
Payer: COMMERCIAL

## 2024-04-25 ENCOUNTER — NON-APPOINTMENT (OUTPATIENT)
Age: 59
End: 2024-04-25

## 2024-04-25 ENCOUNTER — OUTPATIENT (OUTPATIENT)
Dept: OUTPATIENT SERVICES | Facility: HOSPITAL | Age: 59
LOS: 1 days | End: 2024-04-25
Payer: COMMERCIAL

## 2024-04-25 DIAGNOSIS — Z98.891 HISTORY OF UTERINE SCAR FROM PREVIOUS SURGERY: Chronic | ICD-10-CM

## 2024-04-25 DIAGNOSIS — Z87.59 PERSONAL HISTORY OF OTHER COMPLICATIONS OF PREGNANCY, CHILDBIRTH AND THE PUERPERIUM: Chronic | ICD-10-CM

## 2024-04-25 DIAGNOSIS — Z98.890 OTHER SPECIFIED POSTPROCEDURAL STATES: Chronic | ICD-10-CM

## 2024-04-25 LAB — POCT ISTAT CREATININE: 1.5 MG/DL — HIGH (ref 0.5–1.3)

## 2024-04-25 PROCEDURE — 74178 CT ABD&PLV WO CNTR FLWD CNTR: CPT | Mod: 26

## 2024-04-25 PROCEDURE — 82565 ASSAY OF CREATININE: CPT

## 2024-04-25 PROCEDURE — 74178 CT ABD&PLV WO CNTR FLWD CNTR: CPT

## 2024-04-26 VITALS
RESPIRATION RATE: 17 BRPM | TEMPERATURE: 98 F | OXYGEN SATURATION: 94 % | SYSTOLIC BLOOD PRESSURE: 144 MMHG | HEIGHT: 69 IN | DIASTOLIC BLOOD PRESSURE: 80 MMHG | HEART RATE: 65 BPM | WEIGHT: 293 LBS

## 2024-04-26 RX ORDER — CARVEDILOL PHOSPHATE 80 MG/1
1 CAPSULE, EXTENDED RELEASE ORAL
Refills: 0 | DISCHARGE

## 2024-04-26 RX ORDER — ACETAMINOPHEN 500 MG
2 TABLET ORAL
Qty: 0 | Refills: 0 | DISCHARGE

## 2024-04-26 RX ORDER — LOSARTAN POTASSIUM 100 MG/1
1 TABLET, FILM COATED ORAL
Refills: 0 | DISCHARGE

## 2024-04-26 RX ORDER — HYDROCHLOROTHIAZIDE 25 MG
1 TABLET ORAL
Refills: 0 | DISCHARGE

## 2024-04-26 NOTE — ASU PATIENT PROFILE, ADULT - FALL HARM RISK - UNIVERSAL INTERVENTIONS
Bed in lowest position, wheels locked, appropriate side rails in place/Call bell, personal items and telephone in reach/Instruct patient to call for assistance before getting out of bed or chair/Non-slip footwear when patient is out of bed/[place] to call system/Physically safe environment - no spills, clutter or unnecessary equipment/Purposeful Proactive Rounding/Room/bathroom lighting operational, light cord in reach

## 2024-04-26 NOTE — ASU PATIENT PROFILE, ADULT - NSICDXPASTMEDICALHX_GEN_ALL_CORE_FT
PAST MEDICAL HISTORY:  Abnormal vaginal bleeding     Heart murmur     HTN (hypertension)     Obesity     Snores

## 2024-04-26 NOTE — ASU PATIENT PROFILE, ADULT - NSICDXPASTSURGICALHX_GEN_ALL_CORE_FT
PAST SURGICAL HISTORY:  H/O  section     H/O induced      H/O wrist surgery right + hardware    History of ectopic pregnancy

## 2024-04-26 NOTE — ASU PATIENT PROFILE, ADULT - REASON FOR ADMISSION, PROFILE
Cystoscopy , right uteroscopy with biopsy and/or  fulguration of ureteral or renal pelvic lesion; D&C hysteroscopy

## 2024-04-28 ENCOUNTER — TRANSCRIPTION ENCOUNTER (OUTPATIENT)
Age: 59
End: 2024-04-28

## 2024-04-29 ENCOUNTER — TRANSCRIPTION ENCOUNTER (OUTPATIENT)
Age: 59
End: 2024-04-29

## 2024-04-29 ENCOUNTER — RESULT REVIEW (OUTPATIENT)
Age: 59
End: 2024-04-29

## 2024-04-29 ENCOUNTER — APPOINTMENT (OUTPATIENT)
Dept: GYNECOLOGIC ONCOLOGY | Facility: HOSPITAL | Age: 59
End: 2024-04-29
Payer: COMMERCIAL

## 2024-04-29 ENCOUNTER — OUTPATIENT (OUTPATIENT)
Dept: OUTPATIENT SERVICES | Facility: HOSPITAL | Age: 59
LOS: 1 days | Discharge: HOME | End: 2024-04-29
Payer: COMMERCIAL

## 2024-04-29 ENCOUNTER — APPOINTMENT (OUTPATIENT)
Dept: UROLOGY | Facility: HOSPITAL | Age: 59
End: 2024-04-29

## 2024-04-29 VITALS
SYSTOLIC BLOOD PRESSURE: 125 MMHG | RESPIRATION RATE: 19 BRPM | DIASTOLIC BLOOD PRESSURE: 58 MMHG | HEART RATE: 71 BPM | OXYGEN SATURATION: 92 % | TEMPERATURE: 97 F

## 2024-04-29 DIAGNOSIS — Z98.890 OTHER SPECIFIED POSTPROCEDURAL STATES: Chronic | ICD-10-CM

## 2024-04-29 DIAGNOSIS — Z98.891 HISTORY OF UTERINE SCAR FROM PREVIOUS SURGERY: Chronic | ICD-10-CM

## 2024-04-29 DIAGNOSIS — Z87.59 PERSONAL HISTORY OF OTHER COMPLICATIONS OF PREGNANCY, CHILDBIRTH AND THE PUERPERIUM: Chronic | ICD-10-CM

## 2024-04-29 PROCEDURE — 88112 CYTOPATH CELL ENHANCE TECH: CPT

## 2024-04-29 PROCEDURE — 88342 IMHCHEM/IMCYTCHM 1ST ANTB: CPT | Mod: 26

## 2024-04-29 PROCEDURE — ZZZZZ: CPT

## 2024-04-29 PROCEDURE — C9399: CPT

## 2024-04-29 PROCEDURE — 74420 UROGRAPHY RTRGR +-KUB: CPT | Mod: 26

## 2024-04-29 PROCEDURE — 52332 CYSTOSCOPY AND TREATMENT: CPT | Mod: RT

## 2024-04-29 PROCEDURE — C1889: CPT

## 2024-04-29 PROCEDURE — 76000 FLUOROSCOPY <1 HR PHYS/QHP: CPT

## 2024-04-29 PROCEDURE — 88342 IMHCHEM/IMCYTCHM 1ST ANTB: CPT

## 2024-04-29 PROCEDURE — 88305 TISSUE EXAM BY PATHOLOGIST: CPT | Mod: 26,59

## 2024-04-29 PROCEDURE — 88305 TISSUE EXAM BY PATHOLOGIST: CPT

## 2024-04-29 PROCEDURE — 58558 HYSTEROSCOPY BIOPSY: CPT

## 2024-04-29 PROCEDURE — C1758: CPT

## 2024-04-29 PROCEDURE — 52351 CYSTOURETERO & OR PYELOSCOPE: CPT | Mod: RT

## 2024-04-29 PROCEDURE — C1769: CPT

## 2024-04-29 PROCEDURE — C2617: CPT

## 2024-04-29 PROCEDURE — 88360 TUMOR IMMUNOHISTOCHEM/MANUAL: CPT

## 2024-04-29 PROCEDURE — 88112 CYTOPATH CELL ENHANCE TECH: CPT | Mod: 26

## 2024-04-29 DEVICE — URETERAL SHEATH NAVIGATOR HD 11/13FR X 36CM: Type: IMPLANTABLE DEVICE | Status: FUNCTIONAL

## 2024-04-29 DEVICE — URETERAL STENT CONTOUR 6FR 24CM: Type: IMPLANTABLE DEVICE | Status: FUNCTIONAL

## 2024-04-29 DEVICE — URETERAL CATH OPEN END FLEXI-TIP 5FR .038" X 70CM: Type: IMPLANTABLE DEVICE | Status: FUNCTIONAL

## 2024-04-29 DEVICE — GUIDEWIRE SENSOR DUAL-FLEX NITINOL STRAIGHT .035" X 150CM: Type: IMPLANTABLE DEVICE | Status: FUNCTIONAL

## 2024-04-29 DEVICE — URETERAL STENT CONTOUR 6FR 26CM: Type: IMPLANTABLE DEVICE | Status: FUNCTIONAL

## 2024-04-29 DEVICE — URETERAL CATH DUAL LUMEN 10FR 54CM: Type: IMPLANTABLE DEVICE | Status: FUNCTIONAL

## 2024-04-29 RX ORDER — CARVEDILOL PHOSPHATE 80 MG/1
1 CAPSULE, EXTENDED RELEASE ORAL
Refills: 0 | DISCHARGE

## 2024-04-29 RX ORDER — TAMSULOSIN HYDROCHLORIDE 0.4 MG/1
0.4 CAPSULE ORAL ONCE
Refills: 0 | Status: DISCONTINUED | OUTPATIENT
Start: 2024-04-29 | End: 2024-04-29

## 2024-04-29 RX ORDER — PHENAZOPYRIDINE HCL 100 MG
100 TABLET ORAL ONCE
Refills: 0 | Status: DISCONTINUED | OUTPATIENT
Start: 2024-04-29 | End: 2024-04-29

## 2024-04-29 RX ORDER — ACETAMINOPHEN 500 MG
650 TABLET ORAL EVERY 4 HOURS
Refills: 0 | Status: DISCONTINUED | OUTPATIENT
Start: 2024-04-29 | End: 2024-04-29

## 2024-04-29 RX ORDER — TAMSULOSIN HYDROCHLORIDE 0.4 MG/1
1 CAPSULE ORAL
Qty: 0 | Refills: 0 | DISCHARGE
Start: 2024-04-29

## 2024-04-29 RX ORDER — LOSARTAN/HYDROCHLOROTHIAZIDE 100MG-25MG
1 TABLET ORAL
Refills: 0 | DISCHARGE

## 2024-04-29 RX ORDER — OXYBUTYNIN CHLORIDE 5 MG
1 TABLET ORAL
Qty: 0 | Refills: 0 | DISCHARGE
Start: 2024-04-29

## 2024-04-29 RX ORDER — OXYBUTYNIN CHLORIDE 5 MG
5 TABLET ORAL EVERY 8 HOURS
Refills: 0 | Status: DISCONTINUED | OUTPATIENT
Start: 2024-04-29 | End: 2024-04-29

## 2024-04-29 RX ORDER — ATORVASTATIN CALCIUM 80 MG/1
1 TABLET, FILM COATED ORAL
Refills: 0 | DISCHARGE

## 2024-04-29 NOTE — DISCHARGE NOTE NURSING/CASE MANAGEMENT/SOCIAL WORK - NSDCPEFALRISK_GEN_ALL_CORE
For information on Fall & Injury Prevention, visit: [email or web address] OR  [email or web address] OR  https://www.cdc.gov/steadi/patient.html

## 2024-04-29 NOTE — BRIEF OPERATIVE NOTE - OPERATION/FINDINGS
s/p hysteroscopy dilation and curettage. Cervix serially dilated to number 16 Curt dilator, hysteroscope introduced into intrauterine cavity. Normal appearing atrophic endometrial lining noted. Sharp curettage performed.

## 2024-04-29 NOTE — BRIEF OPERATIVE NOTE - OPERATION/FINDINGS
Large mass effect displacing left renal pelvis; no hydronephrosis or collecting system lesions  Left 6-26 ureteral stent placed Large mass effect displacing right renal pelvis; no hydronephrosis or collecting system lesions  Right 6-26 ureteral stent placed

## 2024-04-29 NOTE — ASU DISCHARGE PLAN (ADULT/PEDIATRIC) - ASU DC SPECIAL INSTRUCTIONSFT
URETEROSCOPY    GENERAL: It is common to have blood in your urine after your procedure. It may be pink or even red; and it is important to increase fluid intake to 2-3L of water per day to keep the urine as clear as possible. Please inform your doctor if you have a significant amount of clot in the urine or if you are unable to void at all. The urine may clear and then become bloody again especially as you are more physically active.    STENT: You may have an internal stent (a hollow tube that runs from the kidney to your bladder) after your procedure, which helps urine drain from the kidney to your bladder. Some patients experience urinary frequency, burning, or even back pain (especially with urination). These sensations will gradually get better. Increasing your fluid intake can also improve these symptoms. While the stent is in place, your urine may continue to be bloody. This stent is temporary and must be removed by your urologist as an outpatient with in 3 months unless otherwise specified. If your stent is on a string, it is secured to your leg or genitalia with an adhesive bandage.    PAIN: You may take Tylenol (acetaminophen) 650-975mg and/or Motrin (ibuprofen) 400-600mg, both available over the counter, for pain every 6 hours as needed. Do not exceed 4000mg of Tylenol (acetaminophen) daily. You may alternate these medications such that you take one or the other every 3 hours for around the clock pain coverage.    STOOL SOFTENERS: Do not allow yourself to become constipated as straining may cause bleeding. Take stool softeners or a laxative (ex. Miralax, Colace, Senokot, ExLax, etc), available over the counter, if needed.    ANTICOAGULATION: If you are taking any blood thinning medications, please discuss with your urologist prior to restarting these medications unless otherwise specified.    BATHING: You may shower or bathe.    DIET: You may resume your regular diet and regular medication regimen.    ACTIVITY: No heavy lifting or strenuous exercise until you are evaluated at your post-operative appointment. Otherwise, you may return to your usual level of physical activity.    FOLLOW-UP: If you did not already schedule your post-operative appointment, please call your urologist to schedule and follow-up appointment. Follow up with Dr. Munoz to discuss right renal mass management.    CALL YOUR UROLOGIST IF: You have any bleeding that does not stop, inability to void >8 hours, fever over 100.4 F, chills, persistent nausea/vomiting, changes in your incision concerning for infection, or if your pain is not controlled on your discharge pain medications.

## 2024-04-29 NOTE — BRIEF OPERATIVE NOTE - NSICDXBRIEFPREOP_GEN_ALL_CORE_FT
PRE-OP DIAGNOSIS:  Gross hematuria 29-Apr-2024 15:18:08  Eliazar Braxton  
PRE-OP DIAGNOSIS:  Postmenopausal bleeding 29-Apr-2024 14:50:14  Rd Mayo

## 2024-04-29 NOTE — BRIEF OPERATIVE NOTE - NSICDXBRIEFPROCEDURE_GEN_ALL_CORE_FT
PROCEDURES:  Hysteroscopy, with dilation and curettage 29-Apr-2024 14:48:08  Rd Mayo  
PROCEDURES:  Cystoscopy with left retrograde pyelography with ureteroscopy with insertion of ureteral stent 29-Apr-2024 15:17:58  Eliazar Braxton

## 2024-04-29 NOTE — ASU DISCHARGE PLAN (ADULT/PEDIATRIC) - CARE PROVIDER_API CALL
Coby Munoz  Urology  [address] [place] [id number]  Phone: [phone]  Fax: [phone]  Follow Up Time:

## 2024-04-29 NOTE — BRIEF OPERATIVE NOTE - NSICDXBRIEFPOSTOP_GEN_ALL_CORE_FT
POST-OP DIAGNOSIS:  Gross hematuria 29-Apr-2024 15:18:15  Eliazar Braxton  
POST-OP DIAGNOSIS:  Postmenopausal bleeding 29-Apr-2024 14:50:23  Rd Mayo

## 2024-04-29 NOTE — DISCHARGE NOTE NURSING/CASE MANAGEMENT/SOCIAL WORK - PATIENT PORTAL LINK FT
You can access the FollowMyHealth Patient Portal offered by [facility] by registering at the following website: [email or web address]. By joining [employer]’s FollowMyHealth portal, you will also be able to view your health information using other applications (apps) compatible with our system.

## 2024-04-30 PROBLEM — R06.83 SNORING: Chronic | Status: ACTIVE | Noted: 2024-04-26

## 2024-05-01 ENCOUNTER — NON-APPOINTMENT (OUTPATIENT)
Age: 59
End: 2024-05-01

## 2024-05-02 LAB — NON-GYNECOLOGICAL CYTOLOGY STUDY: SIGNIFICANT CHANGE UP

## 2024-05-07 LAB — SURGICAL PATHOLOGY STUDY: SIGNIFICANT CHANGE UP

## 2024-05-08 ENCOUNTER — NON-APPOINTMENT (OUTPATIENT)
Age: 59
End: 2024-05-08

## 2024-05-09 ENCOUNTER — NON-APPOINTMENT (OUTPATIENT)
Age: 59
End: 2024-05-09

## 2024-05-16 ENCOUNTER — APPOINTMENT (OUTPATIENT)
Dept: UROLOGY | Facility: CLINIC | Age: 59
End: 2024-05-16
Payer: COMMERCIAL

## 2024-05-16 VITALS
OXYGEN SATURATION: 96 % | HEIGHT: 69 IN | HEART RATE: 92 BPM | BODY MASS INDEX: 43.4 KG/M2 | SYSTOLIC BLOOD PRESSURE: 153 MMHG | DIASTOLIC BLOOD PRESSURE: 82 MMHG | TEMPERATURE: 98 F | WEIGHT: 293 LBS

## 2024-05-16 PROCEDURE — 52310 CYSTOSCOPY AND TREATMENT: CPT

## 2024-05-16 NOTE — HISTORY OF PRESENT ILLNESS
[FreeTextEntry1] : Name NII GARCIA MRN [id number]  May 29 1965 ------------------------------------------------------------------------------------------------------------------------------------------- Date of First Visit: 24 Referring Provider/PCP: Dr. Coby Munoz / Dr. Reyes Perry ------------------------------------------------------------------------------------------------------------------------------------------- CC: Gross hematuria, renal mass  History of Present Illness: NII GARCIA is a 58-year-old female PMH HTN, morbid obesity, recently admitted to [facility] with gross hematuria, found to have an 8 cm enhancing right renal mass projecting into the renal pelvis with probable blood clots in the renal pelvis and delayed right renal excretion. Questionable 1.7cm lymph node. Also with recent GYN history notable for 1 day of heavy vaginal bleeding, symptoms of incomplete voiding, suprapubic pain. Cr 1.5 (1.22 in 3/29/24) Of note, personal review of her CT scan from the hospital demonstrates that the large renal mass described, however there is no nephrographic phase to accurately characterize the renal mass in question. ------------------------------------------------------------------------------------------------------------------------------------------- Interval History (2024): NII GARCIA presents s/p right ureteroscopy and stent placement on . URS was unremarkable. She tolerated the procedure well and was discharged home on the same day. Patient presents today for stent removal and postoperative follow-up.  She feels well. Denies fever, chills, nausea, vomiting. Intermittent mild stent-related symptoms - gross hematuria; incontinence.  Procedure: Stent was removed cystoscopically using sterile technique. Stent was intact. Patient tolerated the procedure well.

## 2024-05-16 NOTE — ASSESSMENT
[FreeTextEntry1] : Assessment:  NII GARCIA is a 59 y/o F who presents s/p diagnostic right ureteroscopy.    Plan: -ANNABEL Ramos 6/3/24 at noon

## 2024-05-31 ENCOUNTER — APPOINTMENT (OUTPATIENT)
Dept: GYNECOLOGIC ONCOLOGY | Facility: CLINIC | Age: 59
End: 2024-05-31
Payer: COMMERCIAL

## 2024-05-31 VITALS
WEIGHT: 293 LBS | OXYGEN SATURATION: 96 % | TEMPERATURE: 97.9 F | SYSTOLIC BLOOD PRESSURE: 129 MMHG | HEIGHT: 69 IN | BODY MASS INDEX: 43.4 KG/M2 | DIASTOLIC BLOOD PRESSURE: 72 MMHG | HEART RATE: 108 BPM

## 2024-05-31 DIAGNOSIS — N95.0 POSTMENOPAUSAL BLEEDING: ICD-10-CM

## 2024-05-31 PROCEDURE — 99212 OFFICE O/P EST SF 10 MIN: CPT

## 2024-05-31 NOTE — REASON FOR VISIT
Outcome: Declined colonoscopy wanted to do the fit test instead / hasnt had a colonoscopy in a few years.     Please transfer to Patient Outreach Team at [phone] when patient returns call.    WebIZ Checked & Epic Updated:  no    HealthConnect Verified: yes    Attempt # 1           [FreeTextEntry1] : Post op

## 2024-05-31 NOTE — PHYSICAL EXAM
[Chaperone Present] : A chaperone was present in the examining room during all aspects of the physical examination [Fully active, able to carry on all pre-disease performance without restriction] : Status 0 - Fully active, able to carry on all pre-disease performance without restriction [Normal] : Masses/Tenderness: Non-tender, no masses

## 2024-05-31 NOTE — DISCUSSION/SUMMARY
[Reviewed Clinical Lab Test(s)] : Results of clinical tests were reviewed. [Reviewed Radiology Report(s)] : Radiology reports were reviewed. [Visit Time ___ Minutes] : [unfilled] minutes [FreeTextEntry1] : 57yo postmenopausal since 47yo referred by Dr. Laury Solis for thickened endometrium and post menopausal bleeding now s/p D&C, hysteroscopy 4/29/24 with benign pathology  Excellent post operative healing Pathology reviewed in detail  Further care with Dr. Solis and urology

## 2024-05-31 NOTE — HISTORY OF PRESENT ILLNESS
[FreeTextEntry1] : Problem 1) Thickened endometrium 2) PMB   Previous Therapy 1) pelvic US 24   a) Uterus 10.4cm, 8mm endometrial stripe 2) D&C, hysteroscopy  24    a) benign squamous and endocervial epithelium 3) D&C, hysteroscopy 24    1.  Endometrial curettage: -    Predominantly detached strips of inactive endometrium with metaplastic changes (see note) -   Benign smooth muscle tissue and squamous epithelium -   Focal detached fragments of necrotic debris Note: Although the endometrium shows mild nuclear pleomorphism, no increased mitotic activity is observed.  P53 shows heterogeneous (wild-type) expression pattern and Ki-67 shows low proliferation index. Overall features are consistent with reactive metaplastic process. Clinical correlation recommended.  Here for 1 month post op visit. Feeling well. Had stent removed 2 weeks ago. Continues to have intermittent haematuria.  OBhx; 1x , 1x CS, 1 x SAB, 1 x VTOP D+C, 1x ectopic s/p salpingectomy Gynhx: PMB PMH: HTN, heart murmur (seeing cardiology) PSH: CS, open salpingectomy, right wrist surgery, EMB under anesthesia 2024 meds: valsartan/HCTZ 100/0.25 qd, Carvedilol 6.25 BID, Atorvastatin 10 qd, probiotics all: lisinopril (lip swelling) social: denies Fhx: brother w/ lung cancer dx at 47yo, maternal grandmother w/ breast cancer dx in her 60's)  Last pap smear: 2023 (wnl) Last mammogram:  (wnl) colonoscopy:  wnl

## 2024-06-03 ENCOUNTER — APPOINTMENT (OUTPATIENT)
Dept: UROLOGY | Facility: CLINIC | Age: 59
End: 2024-06-03
Payer: COMMERCIAL

## 2024-06-03 VITALS
OXYGEN SATURATION: 98 % | TEMPERATURE: 98 F | HEART RATE: 77 BPM | DIASTOLIC BLOOD PRESSURE: 108 MMHG | WEIGHT: 293 LBS | SYSTOLIC BLOOD PRESSURE: 171 MMHG | HEIGHT: 69 IN | RESPIRATION RATE: 14 BRPM | BODY MASS INDEX: 43.4 KG/M2

## 2024-06-03 VITALS — HEART RATE: 77 BPM | DIASTOLIC BLOOD PRESSURE: 84 MMHG | SYSTOLIC BLOOD PRESSURE: 131 MMHG | OXYGEN SATURATION: 98 %

## 2024-06-03 DIAGNOSIS — N28.89 OTHER SPECIFIED DISORDERS OF KIDNEY AND URETER: ICD-10-CM

## 2024-06-03 DIAGNOSIS — N32.81 OVERACTIVE BLADDER: ICD-10-CM

## 2024-06-03 PROCEDURE — 99215 OFFICE O/P EST HI 40 MIN: CPT

## 2024-06-03 RX ORDER — MIRABEGRON 50 MG/1
50 TABLET, EXTENDED RELEASE ORAL
Qty: 30 | Refills: 3 | Status: ACTIVE | COMMUNITY
Start: 2024-06-03 | End: 1900-01-01

## 2024-06-03 NOTE — HISTORY OF PRESENT ILLNESS
[FreeTextEntry1] : NII GARCIA May 29 1965   Language: English  Date of First visit: 2024 Accompanied by: flores Wolf Contact info:  Referring Provider/PCP: Dr. MORALES Fax: [phone]    CC/ Problem List: renal mass =============================================================================== FIRST VISIT / Summary: Very pleasant 59 year old F here for discussion of right renal mass. Has urinary frequency x4-5 years. Wears poise pads 4-5 daily. UUI predominant. Has tried oxybutynin and not helpful. She felt she was voiding more and stopped taking it. No bleeding or blood clots in past 2 weeks Denies strain to void, dysuria, prolapse sensation or constipation.  Here primarily for f/u of right renal mass. Long discussion given body habitus, unable to have IR biopsy in bore of CT. Similarly office bx risky. Intra-operative biopsy not able to distinguish oncocytoma from clear cell thus will not function to decide.    ------------------------------------------------------------------------------------------- INTERVAL VISITS:   ===============================================================================   PMH: HTN, cholesterol Meds: carvedilol 6.25 mg daily, losartan-HCTZ 100-25 daily, atorvastatin 10 mg, All: lisinopril-edema FHx: no  malignancies SocHx: non smoker, social Etoh,  (NSVDx1, c/s x1)   PSH: right fallopian tube removed, 1 , right wrist with metal plate   ROS: Review of Systems is as per HPI unless otherwise denoted below   =============================================================================== DATA: LABS (SELECTED):---------------------------------------------------------------------------------------------------     RADS:------------------------------------------------------------------------------------------------------------------- 2024- CT Abdomen and Pelvis w/wo contrast- 8 cm hyperenhancing right renal mass, most consistent with renal cell carcinoma   PATHOLOGY/CYTOLOGY:-------------------------------------------------------------------------------------------     VOIDING STUDIES: ----------------------------------------------------------------------------------------------------  2024 PVR 65   STONE STUDIES: (Analysis/LLSA)----------------------------------------------------------------------------------     PROCEDURES: -----------------------------------------------------------------------------------------------       =============================================================================== PHYSICAL EXAM:    FOCUSED: ----------------------------------------------------------------------------------------------------------------     ======================================================================================= DISCUSSION: ======================================================================================= ASSESSMENT and PLAN   1. right renal mass - Mass too central for partial nephrectomy. High risk of dialysis if she has nephrectomy - unable to biopsy as above, thus need to balance risk of potential progression with obtaining clear signs of malignancy prior to lap nephrectomy - Sestamibi scan and CT chest - f/u in 1 month after the above scans to decide on nephrectomy vs continued observation  2. OAB - UA/UCx - prescribed Mirabegron   =======================================================================================  The total time personally spent preparing for this visit (reviewing test results, obtaining external history) and during the visit (ordering tests/medications, spent face to face with the patient / family and counseling them on the above), as well as after the visit (on clinical documentation and coordination with other care providers) was approximately 45 minutes.   Thank you for allowing me to assist in the care of your patient. Should you have any questions please do not hesitate to reach out to me.     Christiano Ramos MD                                                        [facility] Physician [facility]   [place] Office:  [facility], [address] [place] [address] T: [phone] F: [phone]   [place] Office:  [address], [address] [place] [address] T: [phone] F: [phone]

## 2024-06-04 LAB
APPEARANCE: CLEAR
BACTERIA: NEGATIVE /HPF
BILIRUBIN URINE: NEGATIVE
BLOOD URINE: NEGATIVE
CAST: 1 /LPF
COLOR: YELLOW
EPITHELIAL CELLS: 1 /HPF
GLUCOSE QUALITATIVE U: NEGATIVE MG/DL
KETONES URINE: NEGATIVE MG/DL
LEUKOCYTE ESTERASE URINE: ABNORMAL
MICROSCOPIC-UA: NORMAL
NITRITE URINE: NEGATIVE
PH URINE: 6
PROTEIN URINE: NEGATIVE MG/DL
RED BLOOD CELLS URINE: 0 /HPF
SPECIFIC GRAVITY URINE: 1.01
UROBILINOGEN URINE: 0.2 MG/DL
WHITE BLOOD CELLS URINE: 24 /HPF

## 2024-06-11 LAB — BACTERIA UR CULT: ABNORMAL

## 2024-06-11 RX ORDER — CEFUROXIME AXETIL 500 MG/1
500 TABLET ORAL
Qty: 14 | Refills: 0 | Status: ACTIVE | COMMUNITY
Start: 2024-06-11 | End: 1900-01-01

## 2024-06-15 ENCOUNTER — APPOINTMENT (OUTPATIENT)
Dept: CT IMAGING | Facility: IMAGING CENTER | Age: 59
End: 2024-06-15
Payer: COMMERCIAL

## 2024-06-15 ENCOUNTER — OUTPATIENT (OUTPATIENT)
Dept: OUTPATIENT SERVICES | Facility: HOSPITAL | Age: 59
LOS: 1 days | End: 2024-06-15
Payer: COMMERCIAL

## 2024-06-15 DIAGNOSIS — Z98.890 OTHER SPECIFIED POSTPROCEDURAL STATES: Chronic | ICD-10-CM

## 2024-06-15 DIAGNOSIS — N28.89 OTHER SPECIFIED DISORDERS OF KIDNEY AND URETER: ICD-10-CM

## 2024-06-15 PROCEDURE — 71260 CT THORAX DX C+: CPT | Mod: 26

## 2024-06-15 PROCEDURE — 71260 CT THORAX DX C+: CPT

## 2024-07-08 ENCOUNTER — APPOINTMENT (OUTPATIENT)
Dept: NUCLEAR MEDICINE | Facility: IMAGING CENTER | Age: 59
End: 2024-07-08
Payer: COMMERCIAL

## 2024-07-08 ENCOUNTER — OUTPATIENT (OUTPATIENT)
Dept: OUTPATIENT SERVICES | Facility: HOSPITAL | Age: 59
LOS: 1 days | End: 2024-07-08
Payer: COMMERCIAL

## 2024-07-08 DIAGNOSIS — N28.89 OTHER SPECIFIED DISORDERS OF KIDNEY AND URETER: ICD-10-CM

## 2024-07-08 DIAGNOSIS — Z98.890 OTHER SPECIFIED POSTPROCEDURAL STATES: Chronic | ICD-10-CM

## 2024-07-08 DIAGNOSIS — Z87.59 PERSONAL HISTORY OF OTHER COMPLICATIONS OF PREGNANCY, CHILDBIRTH AND THE PUERPERIUM: Chronic | ICD-10-CM

## 2024-07-08 DIAGNOSIS — Z98.891 HISTORY OF UTERINE SCAR FROM PREVIOUS SURGERY: Chronic | ICD-10-CM

## 2024-07-08 PROCEDURE — 78830 RP LOCLZJ TUM SPECT W/CT 1: CPT | Mod: 26

## 2024-07-08 PROCEDURE — A9500: CPT

## 2024-07-08 PROCEDURE — 78830 RP LOCLZJ TUM SPECT W/CT 1: CPT

## 2024-07-17 ENCOUNTER — NON-APPOINTMENT (OUTPATIENT)
Age: 59
End: 2024-07-17

## 2024-07-17 PROBLEM — N18.30 CKD (CHRONIC KIDNEY DISEASE), STAGE III: Status: ACTIVE | Noted: 2024-07-17

## 2024-07-19 ENCOUNTER — APPOINTMENT (OUTPATIENT)
Dept: UROLOGY | Facility: CLINIC | Age: 59
End: 2024-07-19
Payer: COMMERCIAL

## 2024-07-19 DIAGNOSIS — N18.4 CHRONIC KIDNEY DISEASE, STAGE 4 (SEVERE): ICD-10-CM

## 2024-07-19 DIAGNOSIS — N28.89 OTHER SPECIFIED DISORDERS OF KIDNEY AND URETER: ICD-10-CM

## 2024-07-19 PROCEDURE — 99215 OFFICE O/P EST HI 40 MIN: CPT

## 2024-07-21 PROBLEM — N18.4 CKD (CHRONIC KIDNEY DISEASE), STAGE IV: Status: RESOLVED | Noted: 2024-07-17 | Resolved: 2024-07-21

## 2024-07-29 ENCOUNTER — APPOINTMENT (OUTPATIENT)
Dept: NEPHROLOGY | Facility: CLINIC | Age: 59
End: 2024-07-29
Payer: COMMERCIAL

## 2024-07-29 VITALS — SYSTOLIC BLOOD PRESSURE: 122 MMHG | RESPIRATION RATE: 12 BRPM | HEART RATE: 78 BPM | DIASTOLIC BLOOD PRESSURE: 78 MMHG

## 2024-07-29 DIAGNOSIS — N28.89 OTHER SPECIFIED DISORDERS OF KIDNEY AND URETER: ICD-10-CM

## 2024-07-29 DIAGNOSIS — N32.81 OVERACTIVE BLADDER: ICD-10-CM

## 2024-07-29 DIAGNOSIS — N95.0 POSTMENOPAUSAL BLEEDING: ICD-10-CM

## 2024-07-29 DIAGNOSIS — N18.30 CHRONIC KIDNEY DISEASE, STAGE 3 UNSPECIFIED: ICD-10-CM

## 2024-07-29 DIAGNOSIS — I10 ESSENTIAL (PRIMARY) HYPERTENSION: ICD-10-CM

## 2024-07-29 PROCEDURE — 99205 OFFICE O/P NEW HI 60 MIN: CPT

## 2024-07-29 PROCEDURE — G2211 COMPLEX E/M VISIT ADD ON: CPT | Mod: NC

## 2024-07-29 NOTE — ASSESSMENT
[FreeTextEntry1] : Patient is a 58 yo F with morbid obesity, HTN, renal mass needing excision, CKD who presents to establish care  CKD - stage 3, GFR around 40, will likely drop to 20, will likely need close monitoring to ensure does not end up on HD  HTN - no salt Discussed how to take BP appropriately at home. Advised not to smoke, drink caffeinated beverages or exercise within 30 minutes before measuring BP. Make sure cuff fits arm, as small cuffs can artificially raise BP. Make sure bladder is empty. With the cuff on your bare arm, sit in an upright position with back supported, feet flat on the floor and arm supported at heart level. Make sure the bottom of the cuff is directly above the bend of the elbow. Relax for about five minutes before taking a measurement. Resist the urge to talk or look at a cellphone. Wait one minute and retake BP, consider 3rd if elevated. Average the three readings and record in log book, bring to each check up.  Bring device at next visit to ensure accuracy   Morbid obesity - discussed weight loss after surgery  RTC in 2-3 monhs, will followup in hosptial

## 2024-07-29 NOTE — PHYSICAL EXAM
[General Appearance - Alert] : alert [General Appearance - In No Acute Distress] : in no acute distress [Sclera] : the sclera and conjunctiva were normal [PERRL With Normal Accommodation] : pupils were equal in size, round, and reactive to light [Extraocular Movements] : extraocular movements were intact [Outer Ear] : the ears and nose were normal in appearance [Oropharynx] : the oropharynx was normal [Neck Appearance] : the appearance of the neck was normal [Neck Cervical Mass (___cm)] : no neck mass was observed [Jugular Venous Distention Increased] : there was no jugular-venous distention [Thyroid Diffuse Enlargement] : the thyroid was not enlarged [Thyroid Nodule] : there were no palpable thyroid nodules [Respiration, Rhythm And Depth] : normal respiratory rhythm and effort [Exaggerated Use Of Accessory Muscles For Inspiration] : no accessory muscle use [Auscultation Breath Sounds / Voice Sounds] : lungs were clear to auscultation bilaterally [Heart Rate And Rhythm] : heart rate was normal and rhythm regular [Heart Sounds] : normal S1 and S2 [Heart Sounds Gallop] : no gallops [Murmurs] : no murmurs [Heart Sounds Pericardial Friction Rub] : no pericardial rub [Veins - Varicosity Changes] : there were no varicosital changes [FreeTextEntry1] : 2+ to to knee, with chronic venous insufficiency skin changes [Bowel Sounds] : normal bowel sounds [Abdomen Soft] : soft [Abdomen Tenderness] : non-tender [Abdomen Mass (___ Cm)] : no abdominal mass palpated [No CVA Tenderness] : no ~M costovertebral angle tenderness [No Spinal Tenderness] : no spinal tenderness [Abnormal Walk] : normal gait [Involuntary Movements] : no involuntary movements were seen [Skin Color & Pigmentation] : normal skin color and pigmentation [Skin Turgor] : normal skin turgor [] : no rash [Cranial Nerves] : cranial nerves 2-12 were intact [No Focal Deficits] : no focal deficits [Affect] : the affect was normal [Mood] : the mood was normal

## 2024-07-29 NOTE — HISTORY OF PRESENT ILLNESS
[FreeTextEntry1] : Patient is a 58 yo F with morbid obesity, HTN, renal mass needing excision, CKD who presents to establish care.     Nephrologic History: Stones: None NSAID use: None HTN: Many years, in 40s DM:Pre-DM Prior nephrologist: None Kidney biopsy: No Reduced kidney mass (prematurity): None Pre-eclampsia: Not pre-eclamptic but did have C section with ssecnod who was born 3 months early.  Urination history: Does have incontinence of urine and fz.  Family Hx: No first degree relatives with kidney disease Surgical Hx: R arm metal place (MRI compatible?), C section Social Hx: No smoking, No IVDU, rare social etoh Allergies: Lisinopril (angioedema), seasonal allergies Meds:  losartan-hctz combo pill, atorvastatin, carvedilol BID, myrbetriq

## 2024-07-31 ENCOUNTER — APPOINTMENT (OUTPATIENT)
Dept: UROLOGY | Facility: HOSPITAL | Age: 59
End: 2024-07-31

## 2024-08-06 ENCOUNTER — OUTPATIENT (OUTPATIENT)
Dept: OUTPATIENT SERVICES | Facility: HOSPITAL | Age: 59
LOS: 1 days | End: 2024-08-06
Payer: COMMERCIAL

## 2024-08-06 DIAGNOSIS — I10 ESSENTIAL (PRIMARY) HYPERTENSION: ICD-10-CM

## 2024-08-06 DIAGNOSIS — Z98.890 OTHER SPECIFIED POSTPROCEDURAL STATES: Chronic | ICD-10-CM

## 2024-08-06 DIAGNOSIS — I35.0 NONRHEUMATIC AORTIC (VALVE) STENOSIS: ICD-10-CM

## 2024-08-06 DIAGNOSIS — E78.2 MIXED HYPERLIPIDEMIA: ICD-10-CM

## 2024-08-06 DIAGNOSIS — Z98.891 HISTORY OF UTERINE SCAR FROM PREVIOUS SURGERY: Chronic | ICD-10-CM

## 2024-08-06 DIAGNOSIS — Z87.59 PERSONAL HISTORY OF OTHER COMPLICATIONS OF PREGNANCY, CHILDBIRTH AND THE PUERPERIUM: Chronic | ICD-10-CM

## 2024-08-06 DIAGNOSIS — Z01.818 ENCOUNTER FOR OTHER PREPROCEDURAL EXAMINATION: ICD-10-CM

## 2024-08-06 PROCEDURE — 78452 HT MUSCLE IMAGE SPECT MULT: CPT

## 2024-08-06 PROCEDURE — 93018 CV STRESS TEST I&R ONLY: CPT

## 2024-08-06 PROCEDURE — 78452 HT MUSCLE IMAGE SPECT MULT: CPT | Mod: 26

## 2024-08-06 PROCEDURE — A9500: CPT

## 2024-08-06 PROCEDURE — 93016 CV STRESS TEST SUPVJ ONLY: CPT

## 2024-08-06 PROCEDURE — 93017 CV STRESS TEST TRACING ONLY: CPT

## 2024-08-09 ENCOUNTER — APPOINTMENT (OUTPATIENT)
Dept: UROLOGY | Facility: HOSPITAL | Age: 59
End: 2024-08-09

## 2024-08-09 ENCOUNTER — RESULT REVIEW (OUTPATIENT)
Age: 59
End: 2024-08-09

## 2024-09-03 ENCOUNTER — NON-APPOINTMENT (OUTPATIENT)
Age: 59
End: 2024-09-03

## 2024-09-16 ENCOUNTER — APPOINTMENT (OUTPATIENT)
Dept: UROLOGY | Facility: CLINIC | Age: 59
End: 2024-09-16
Payer: COMMERCIAL

## 2024-09-16 VITALS
HEART RATE: 80 BPM | HEIGHT: 69 IN | BODY MASS INDEX: 43.4 KG/M2 | SYSTOLIC BLOOD PRESSURE: 135 MMHG | TEMPERATURE: 98 F | RESPIRATION RATE: 15 BRPM | DIASTOLIC BLOOD PRESSURE: 80 MMHG | OXYGEN SATURATION: 98 % | WEIGHT: 293 LBS

## 2024-09-16 DIAGNOSIS — C64.1 MALIGNANT NEOPLASM OF RIGHT KIDNEY, EXCEPT RENAL PELVIS: ICD-10-CM

## 2024-09-16 DIAGNOSIS — N32.81 OVERACTIVE BLADDER: ICD-10-CM

## 2024-09-16 PROCEDURE — 99213 OFFICE O/P EST LOW 20 MIN: CPT | Mod: 24

## 2024-09-16 NOTE — HISTORY OF PRESENT ILLNESS
[FreeTextEntry1] : NII GARCIA May 29 1965  Language: English Date of First visit: 2024 Accompanied by: flores Wolf Contact info: Referring Provider/PCP: Dr. Romana Amir Fax: [phone]  CC/ Problem List: right renal cell carcinoma s/p radical nephrectomy =============================================================================== FIRST VISIT / Summary: Very pleasant 59 year old F here for discussion of right renal mass. Has urinary frequency x4-5 years. Wears poise pads 4-5 daily. UUI predominant. Has tried oxybutynin and not helpful. She felt she was voiding more and stopped taking it. No bleeding or blood clots in past 2 weeks Denies strain to void, dysuria, prolapse sensation or constipation.  Here primarily for f/u of right renal mass. Long discussion given body habitus, unable to have IR biopsy in bore of CT. Similarly office bx risky. Intra-operative biopsy not able to distinguish oncocytoma from clear cell thus will not function to decide.  ------------------------------------------------------------------------------------------- INTERVAL VISITS: The patient's medications and allergies were reviewed and edited below. Dated 2024  Reviewed path again. Myrbetriq helping still leaks some if very full. Leaks when she stands. ===============================================================================  PMH: HTN, cholesterol Meds: carvedilol 6.25 mg daily, losartan-HCTZ 100-25 daily, atorvastatin 10 mg, All: lisinopril-edema FHx: no  malignancies SocHx: non smoker, social Etoh,  (NSVDx1, c/s x1)  PSH: right fallopian tube removed, 1 , right wrist with metal plate, Right nephrectomy   ROS: Review of Systems is as per HPI unless otherwise denoted below  =============================================================================== DATA: LABS (SELECTED):---------------------------------------------------------------------------------------------------   RADS:------------------------------------------------------------------------------------------------------------------- 2024- CT Abdomen and Pelvis w/wo contrast- 8 cm hyperenhancing right renal mass, most consistent with renal cell carcinoma 6/15/24: CT chest - no pulmonary nodules 2024: Sestamibi PET scan: poor uptake less than renal parenchyma, does not support diagnosis of a benign oncocytoma or hybrid tumor.  PATHOLOGY/CYTOLOGY:------------------------------------------------------------------------------------------- 2024 Pathology Right clear cell renal carcinoma, G2, negative margins, pT1b  VOIDING STUDIES: ---------------------------------------------------------------------------------------------------- 2024 PVR 65  STONE STUDIES: (Analysis/LLSA)----------------------------------------------------------------------------------   PROCEDURES: -----------------------------------------------------------------------------------------------    =============================================================================== PHYSICAL EXAM:   FOCUSED: ----------------------------------------------------------------------------------------------------------------   ======================================================================================= DISCUSSION: ======================================================================================= ASSESSMENT and PLAN  1. right renal mass - RBUS in 6 months then TTM afterwards - CT scan at 1 year - increase to full activity as tolerated  2. OAB - UA/UCx - prescribed Mirabegron - resent as 90 days - time voiding if able to prevent overfilling  ======================================================================================= The total time personally spent preparing for this visit (reviewing test results, obtaining external history) and during the visit (ordering tests/medications, spent face to face with the patient / family and counseling them on the above), as well as after the visit (on clinical documentation and coordination with other care providers) was approximately 45 minutes. Discussed with Dr. Esteves, sent documents to Dr. Gonzalez etc.  Thank you for allowing me to assist in the care of your patient. Should you have any questions please do not hesitate to reach out to me.   Christiano Ramos MD [facility] Physician [facility]  [place] Office:  [facility], [address] [place] [address] T: [phone] F: [phone]  [place] Office:  [address] [place] [address], 1st [place] [address] T: [phone] F: [phone]

## 2024-09-16 NOTE — HISTORY OF PRESENT ILLNESS
[FreeTextEntry1] : NII GARCIA May 29 1965  Language: English Date of First visit: 2024 Accompanied by: flores Wolf Contact info: Referring Provider/PCP: Dr. Romana Amir Fax: [phone]  CC/ Problem List: right renal cell carcinoma s/p radical nephrectomy =============================================================================== FIRST VISIT / Summary: Very pleasant 59 year old F here for discussion of right renal mass. Has urinary frequency x4-5 years. Wears poise pads 4-5 daily. UUI predominant. Has tried oxybutynin and not helpful. She felt she was voiding more and stopped taking it. No bleeding or blood clots in past 2 weeks Denies strain to void, dysuria, prolapse sensation or constipation.  Here primarily for f/u of right renal mass. Long discussion given body habitus, unable to have IR biopsy in bore of CT. Similarly office bx risky. Intra-operative biopsy not able to distinguish oncocytoma from clear cell thus will not function to decide.  ------------------------------------------------------------------------------------------- INTERVAL VISITS: The patient's medications and allergies were reviewed and edited below. Dated 2024  Reviewed path again. Myrbetriq helping still leaks some if very full. Leaks when she stands. ===============================================================================  PMH: HTN, cholesterol Meds: carvedilol 6.25 mg daily, losartan-HCTZ 100-25 daily, atorvastatin 10 mg, All: lisinopril-edema FHx: no  malignancies SocHx: non smoker, social Etoh,  (NSVDx1, c/s x1)  PSH: right fallopian tube removed, 1 , right wrist with metal plate, Right nephrectomy   ROS: Review of Systems is as per HPI unless otherwise denoted below  =============================================================================== DATA: LABS (SELECTED):---------------------------------------------------------------------------------------------------   RADS:------------------------------------------------------------------------------------------------------------------- 2024- CT Abdomen and Pelvis w/wo contrast- 8 cm hyperenhancing right renal mass, most consistent with renal cell carcinoma 6/15/24: CT chest - no pulmonary nodules 2024: Sestamibi PET scan: poor uptake less than renal parenchyma, does not support diagnosis of a benign oncocytoma or hybrid tumor.  PATHOLOGY/CYTOLOGY:------------------------------------------------------------------------------------------- 2024 Pathology Right clear cell renal carcinoma, G2, negative margins, pT1b  VOIDING STUDIES: ---------------------------------------------------------------------------------------------------- 2024 PVR 65  STONE STUDIES: (Analysis/LLSA)----------------------------------------------------------------------------------   PROCEDURES: -----------------------------------------------------------------------------------------------    =============================================================================== PHYSICAL EXAM:   FOCUSED: ----------------------------------------------------------------------------------------------------------------   ======================================================================================= DISCUSSION: ======================================================================================= ASSESSMENT and PLAN  1. right renal mass - RBUS in 6 months then TTM afterwards - CT scan at 1 year - increase to full activity as tolerated  2. OAB - UA/UCx - prescribed Mirabegron - resent as 90 days - time voiding if able to prevent overfilling  ======================================================================================= The total time personally spent preparing for this visit (reviewing test results, obtaining external history) and during the visit (ordering tests/medications, spent face to face with the patient / family and counseling them on the above), as well as after the visit (on clinical documentation and coordination with other care providers) was approximately 45 minutes. Discussed with Dr. Esteves, sent documents to Dr. Gonzalez etc.  Thank you for allowing me to assist in the care of your patient. Should you have any questions please do not hesitate to reach out to me.   Christiano Ramos MD [facility] Physician [facility]  [place] Office:  [facility], [address] [place] [address] T: [phone] F: [phone]  [place] Office:  [address] [place] [address], [address] [place] [address] T: [phone] F: [phone]

## 2024-09-21 ENCOUNTER — APPOINTMENT (OUTPATIENT)
Dept: ULTRASOUND IMAGING | Facility: IMAGING CENTER | Age: 59
End: 2024-09-21

## 2024-10-04 ENCOUNTER — RX RENEWAL (OUTPATIENT)
Age: 59
End: 2024-10-04

## 2024-10-04 ENCOUNTER — APPOINTMENT (OUTPATIENT)
Dept: NEPHROLOGY | Facility: CLINIC | Age: 59
End: 2024-10-04

## 2024-10-10 ENCOUNTER — APPOINTMENT (OUTPATIENT)
Dept: NEPHROLOGY | Facility: CLINIC | Age: 59
End: 2024-10-10
Payer: COMMERCIAL

## 2024-10-10 VITALS — DIASTOLIC BLOOD PRESSURE: 70 MMHG | HEART RATE: 80 BPM | RESPIRATION RATE: 12 BRPM | SYSTOLIC BLOOD PRESSURE: 122 MMHG

## 2024-10-10 DIAGNOSIS — Z13.1 ENCOUNTER FOR SCREENING FOR DIABETES MELLITUS: ICD-10-CM

## 2024-10-10 DIAGNOSIS — I10 ESSENTIAL (PRIMARY) HYPERTENSION: ICD-10-CM

## 2024-10-10 DIAGNOSIS — Z90.5 ACQUIRED ABSENCE OF KIDNEY: ICD-10-CM

## 2024-10-10 DIAGNOSIS — C64.1 MALIGNANT NEOPLASM OF RIGHT KIDNEY, EXCEPT RENAL PELVIS: ICD-10-CM

## 2024-10-10 PROCEDURE — G2211 COMPLEX E/M VISIT ADD ON: CPT | Mod: NC

## 2024-10-10 PROCEDURE — 99215 OFFICE O/P EST HI 40 MIN: CPT

## 2024-10-16 LAB
25(OH)D3 SERPL-MCNC: 8.7 NG/ML
ALBUMIN SERPL ELPH-MCNC: 3.7 G/DL
ALP BLD-CCNC: 134 U/L
ALT SERPL-CCNC: 9 U/L
ANION GAP SERPL CALC-SCNC: 12 MMOL/L
APPEARANCE: CLEAR
AST SERPL-CCNC: 18 U/L
BACTERIA: ABNORMAL /HPF
BASOPHILS # BLD AUTO: 0.06 K/UL
BASOPHILS NFR BLD AUTO: 0.8 %
BILIRUB SERPL-MCNC: 0.2 MG/DL
BILIRUBIN URINE: NEGATIVE
BLOOD URINE: NEGATIVE
BUN SERPL-MCNC: 23 MG/DL
CALCIUM SERPL-MCNC: 9 MG/DL
CALCIUM SERPL-MCNC: 9 MG/DL
CAST: 1 /LPF
CHLORIDE SERPL-SCNC: 107 MMOL/L
CO2 SERPL-SCNC: 25 MMOL/L
COLOR: YELLOW
CREAT SERPL-MCNC: 1.61 MG/DL
CREAT SPEC-SCNC: 140 MG/DL
CREAT SPEC-SCNC: 140 MG/DL
CREAT/PROT UR: 0.1 RATIO
CYSTATIN C SERPL-MCNC: 2 MG/L
EGFR: 36 ML/MIN/1.73M2
EOSINOPHIL # BLD AUTO: 0.24 K/UL
EOSINOPHIL NFR BLD AUTO: 3.1 %
EPITHELIAL CELLS: 12 /HPF
ESTIMATED AVERAGE GLUCOSE: 128 MG/DL
FERRITIN SERPL-MCNC: 18 NG/ML
GFR/BSA.PRED SERPLBLD CYS-BASED-ARV: 29 ML/MIN/1.73M2
GLUCOSE QUALITATIVE U: NEGATIVE MG/DL
GLUCOSE SERPL-MCNC: 105 MG/DL
HBA1C MFR BLD HPLC: 6.1 %
HCT VFR BLD CALC: 35 %
HGB BLD-MCNC: 10.3 G/DL
IMM GRANULOCYTES NFR BLD AUTO: 0.1 %
IRON SATN MFR SERPL: 6 %
IRON SERPL-MCNC: 23 UG/DL
KETONES URINE: NEGATIVE MG/DL
LEUKOCYTE ESTERASE URINE: NEGATIVE
LYMPHOCYTES # BLD AUTO: 2.35 K/UL
LYMPHOCYTES NFR BLD AUTO: 30.5 %
MAN DIFF?: NORMAL
MCHC RBC-ENTMCNC: 23.9 PG
MCHC RBC-ENTMCNC: 29.4 GM/DL
MCV RBC AUTO: 81.2 FL
MICROALBUMIN 24H UR DL<=1MG/L-MCNC: <1.2 MG/DL
MICROALBUMIN/CREAT 24H UR-RTO: NORMAL MG/G
MICROSCOPIC-UA: NORMAL
MONOCYTES # BLD AUTO: 0.64 K/UL
MONOCYTES NFR BLD AUTO: 8.3 %
NEUTROPHILS # BLD AUTO: 4.4 K/UL
NEUTROPHILS NFR BLD AUTO: 57.2 %
NITRITE URINE: NEGATIVE
PARATHYROID HORMONE INTACT: 160 PG/ML
PH URINE: 5.5
PHOSPHATE SERPL-MCNC: 4.4 MG/DL
PLATELET # BLD AUTO: 295 K/UL
POTASSIUM SERPL-SCNC: 4.9 MMOL/L
PROT SERPL-MCNC: 7.1 G/DL
PROT UR-MCNC: 19 MG/DL
PROTEIN URINE: NORMAL MG/DL
RBC # BLD: 4.31 M/UL
RBC # FLD: 18.7 %
RED BLOOD CELLS URINE: 0 /HPF
SODIUM ?TM SUB UR QN: 131 MMOL/L
SODIUM SERPL-SCNC: 144 MMOL/L
SPECIFIC GRAVITY URINE: 1.02
TIBC SERPL-MCNC: 356 UG/DL
UIBC SERPL-MCNC: 334 UG/DL
URATE SERPL-MCNC: 8.8 MG/DL
URATE UR-MCNC: 33.5 MG/DL
UROBILINOGEN URINE: 0.2 MG/DL
WBC # FLD AUTO: 7.7 K/UL
WHITE BLOOD CELLS URINE: 1 /HPF

## 2025-03-14 ENCOUNTER — APPOINTMENT (OUTPATIENT)
Dept: NEPHROLOGY | Facility: CLINIC | Age: 60
End: 2025-03-14
Payer: COMMERCIAL

## 2025-03-14 VITALS — HEART RATE: 80 BPM | DIASTOLIC BLOOD PRESSURE: 70 MMHG | SYSTOLIC BLOOD PRESSURE: 110 MMHG | RESPIRATION RATE: 12 BRPM

## 2025-03-14 DIAGNOSIS — Z90.5 ACQUIRED ABSENCE OF KIDNEY: ICD-10-CM

## 2025-03-14 DIAGNOSIS — N18.30 CHRONIC KIDNEY DISEASE, STAGE 3 UNSPECIFIED: ICD-10-CM

## 2025-03-14 DIAGNOSIS — Z13.1 ENCOUNTER FOR SCREENING FOR DIABETES MELLITUS: ICD-10-CM

## 2025-03-14 PROCEDURE — 99215 OFFICE O/P EST HI 40 MIN: CPT

## 2025-03-14 PROCEDURE — G2211 COMPLEX E/M VISIT ADD ON: CPT | Mod: NC

## 2025-03-17 ENCOUNTER — APPOINTMENT (OUTPATIENT)
Dept: UROLOGY | Facility: CLINIC | Age: 60
End: 2025-03-17
Payer: COMMERCIAL

## 2025-03-17 DIAGNOSIS — N39.3 STRESS INCONTINENCE (FEMALE) (MALE): ICD-10-CM

## 2025-03-17 DIAGNOSIS — N28.89 OTHER SPECIFIED DISORDERS OF KIDNEY AND URETER: ICD-10-CM

## 2025-03-17 DIAGNOSIS — C64.1 MALIGNANT NEOPLASM OF RIGHT KIDNEY, EXCEPT RENAL PELVIS: ICD-10-CM

## 2025-03-17 PROCEDURE — G2211 COMPLEX E/M VISIT ADD ON: CPT | Mod: NC,93

## 2025-03-17 PROCEDURE — 99215 OFFICE O/P EST HI 40 MIN: CPT | Mod: 93

## 2025-04-02 ENCOUNTER — TRANSCRIPTION ENCOUNTER (OUTPATIENT)
Age: 60
End: 2025-04-02

## 2025-06-11 ENCOUNTER — NON-APPOINTMENT (OUTPATIENT)
Age: 60
End: 2025-06-11

## 2025-06-13 ENCOUNTER — APPOINTMENT (OUTPATIENT)
Dept: NEPHROLOGY | Facility: CLINIC | Age: 60
End: 2025-06-13
Payer: COMMERCIAL

## 2025-06-13 PROCEDURE — G2211 COMPLEX E/M VISIT ADD ON: CPT | Mod: NC

## 2025-06-13 PROCEDURE — 99215 OFFICE O/P EST HI 40 MIN: CPT

## 2025-06-18 VITALS — HEART RATE: 82 BPM | DIASTOLIC BLOOD PRESSURE: 72 MMHG | SYSTOLIC BLOOD PRESSURE: 122 MMHG | RESPIRATION RATE: 12 BRPM

## 2025-07-23 NOTE — REVIEW OF SYSTEMS
Left voice message #2 for patient to give us a call back at the office in reference to their sleep study results.   
Left voice message for patient to give us a call back at the office in reference to their sleep study results.   
[Negative] : Heme/Lymph

## (undated) DEVICE — PACK D&C

## (undated) DEVICE — VENODYNE/SCD SLEEVE CALF MEDIUM

## (undated) DEVICE — TUBING TUR 2 PRONG

## (undated) DEVICE — PACK PERI GYN

## (undated) DEVICE — SOL IRR POUR H2O 500ML

## (undated) DEVICE — GOWN XL

## (undated) DEVICE — PACK CYSTO

## (undated) DEVICE — TUBING STRYKER HYSTEROSCOPY INFLOW OUTFLOW

## (undated) DEVICE — MYOSURE SCOPE SEAL

## (undated) DEVICE — DRAPE IOBAN 23" X 23"

## (undated) DEVICE — TUBING AQUILEX OUTFLOW

## (undated) DEVICE — GOWN ROYAL SILK XL

## (undated) DEVICE — WARMING BLANKET UPPER ADULT

## (undated) DEVICE — TUBING RANGER FLUID IRRIGATION SET DISP

## (undated) DEVICE — DRAPE LIGHT HANDLE COVER (BLUE)

## (undated) DEVICE — AVETA CORAL HYSTEROSCOPE 4.6MM DISP

## (undated) DEVICE — GLV 7.5 PROTEXIS (WHITE)

## (undated) DEVICE — SOL IRR POUR NS 0.9% 1500ML

## (undated) DEVICE — DRAPE LEGGINGS 6" CUFF 28X43"

## (undated) DEVICE — GLV 7 PROTEXIS (WHITE)

## (undated) DEVICE — BASIN SET SINGLE

## (undated) DEVICE — DRAPE C ARM 41X74"

## (undated) DEVICE — LAP PAD W RING 18 X 18"